# Patient Record
Sex: FEMALE | Race: WHITE | NOT HISPANIC OR LATINO | Employment: UNEMPLOYED | URBAN - METROPOLITAN AREA
[De-identification: names, ages, dates, MRNs, and addresses within clinical notes are randomized per-mention and may not be internally consistent; named-entity substitution may affect disease eponyms.]

---

## 2018-06-24 ENCOUNTER — HOSPITAL ENCOUNTER (EMERGENCY)
Facility: HOSPITAL | Age: 43
Discharge: HOME | End: 2018-06-24
Attending: EMERGENCY MEDICINE
Payer: MEDICAID

## 2018-06-24 VITALS
OXYGEN SATURATION: 98 % | RESPIRATION RATE: 18 BRPM | TEMPERATURE: 100 F | SYSTOLIC BLOOD PRESSURE: 116 MMHG | WEIGHT: 150 LBS | HEART RATE: 87 BPM | HEIGHT: 66 IN | BODY MASS INDEX: 24.11 KG/M2 | DIASTOLIC BLOOD PRESSURE: 56 MMHG

## 2018-06-24 DIAGNOSIS — S92.902A CLOSED FRACTURE OF LEFT FOOT, INITIAL ENCOUNTER: Primary | ICD-10-CM

## 2018-06-24 DIAGNOSIS — S99.922A FOOT INJURY, LEFT, INITIAL ENCOUNTER: ICD-10-CM

## 2018-06-24 PROCEDURE — 99283 EMERGENCY DEPT VISIT LOW MDM: CPT | Mod: 25

## 2018-06-24 PROCEDURE — 25000003 PHARM REV CODE 250: Performed by: EMERGENCY MEDICINE

## 2018-06-24 PROCEDURE — 29515 APPLICATION SHORT LEG SPLINT: CPT | Mod: LT

## 2018-06-24 RX ORDER — ACETAMINOPHEN AND CODEINE PHOSPHATE 300; 30 MG/1; MG/1
1 TABLET ORAL EVERY 6 HOURS PRN
Qty: 12 TABLET | Refills: 0 | Status: SHIPPED | OUTPATIENT
Start: 2018-06-24 | End: 2018-07-04

## 2018-06-24 RX ORDER — SERTRALINE HYDROCHLORIDE 100 MG/1
TABLET, FILM COATED ORAL
COMMUNITY
End: 2023-03-22

## 2018-06-24 RX ORDER — DIVALPROEX SODIUM 500 MG/1
TABLET, DELAYED RELEASE ORAL
COMMUNITY
Start: 2018-02-21 | End: 2019-07-08

## 2018-06-24 RX ORDER — LISINOPRIL AND HYDROCHLOROTHIAZIDE 12.5; 2 MG/1; MG/1
TABLET ORAL
COMMUNITY
Start: 2018-01-19 | End: 2019-07-08

## 2018-06-24 RX ORDER — QUETIAPINE FUMARATE 100 MG/1
100 TABLET, FILM COATED ORAL
COMMUNITY
End: 2019-07-08

## 2018-06-24 RX ORDER — ACETAMINOPHEN AND CODEINE PHOSPHATE 300; 30 MG/1; MG/1
1 TABLET ORAL
Status: COMPLETED | OUTPATIENT
Start: 2018-06-24 | End: 2018-06-24

## 2018-06-24 RX ADMIN — ACETAMINOPHEN AND CODEINE PHOSPHATE 1 TABLET: 300; 30 TABLET ORAL at 08:06

## 2018-06-25 NOTE — ED NOTES
"Patient presents to ED with c/o pain to left foot. Patient was walking in heels and tripped, states that she "rolled her foot." Has been taking Ibuprofen and icing her foot with no relief. Swelling noted to lateral aspect of left foot. Denies numbness or tingling. Patient only able to minimally move toes on affected foot.   "

## 2018-06-25 NOTE — DISCHARGE INSTRUCTIONS
Thank you for choosing [facility]! We appreciate you coming to us for your medical care. We hope you feel better soon! Please come back to [facility] for all of your future medical needs.      Sincerely,    Apolinar Love MD  Medical Director  Emergency Department  [facility]

## 2018-06-25 NOTE — ED PROVIDER NOTES
Encounter Date: 2018    SCRIBE #1 NOTE: ISpencer, am scribing for, and in the presence of, Dr. Apolinar Love.       History     Chief Complaint   Patient presents with    Foot Injury     42y F to ED with c/o left foot pain after falling while walking in heels last night. pt unable to bear weight today     This is a 42 y.o. female who has a past medical history of Anxiety; Bipolar 2 disorder; and Pre-eclampsia.   The patient presents to the Emergency Department with complaints of a left foot injury that se sustained yesterday.   Symptoms are associated with pain to the left foot.  Pt denies any loss of consciousness, other injury or symptoms.   Pt reports she [place] when she tripped and rolled her left ankle.  Patient has no prior history of similar symptoms.   Pt has a past surgical history that includes Ovarian cyst removal; Appendectomy;  section; and Partial hysterectomy.      The history is provided by the patient. No  was used.     Review of patient's allergies indicates:  No Known Allergies  Past Medical History:   Diagnosis Date    Anxiety     Bipolar 2 disorder     Pre-eclampsia      Past Surgical History:   Procedure Laterality Date    APPENDECTOMY       SECTION      OVARIAN CYST REMOVAL      PARTIAL HYSTERECTOMY       Family History   Problem Relation Age of Onset    Hypertension Mother     Hypertension Maternal Grandmother      Social History   Substance Use Topics    Smoking status: Current Every Day Smoker     Packs/day: 1.00     Types: Cigarettes    Smokeless tobacco: Not on file    Alcohol use Yes      Comment: occasionally     Review of Systems   Musculoskeletal: Positive for arthralgias (Left foot pain).   All other systems reviewed and are negative.      Physical Exam     Initial Vitals [18 1926]   BP Pulse Resp Temp SpO2   (!) 116/56 87 18 100 °F (37.8 °C) 98 %      MAP       --         Physical  Exam    Nursing note and vitals reviewed.  Constitutional: She appears well-developed and well-nourished. She is not diaphoretic. No distress.   HENT:   Head: Normocephalic and atraumatic.   Mouth/Throat: Oropharynx is clear and moist.   Eyes: Conjunctivae are normal.   Cardiovascular: Normal rate, regular rhythm and intact distal pulses.   Pulmonary/Chest: No respiratory distress.   Musculoskeletal: Normal range of motion.   Tenderness and mild swelling to the base of the left 5th metatarsale.    Neurological: She is alert and oriented to person, place, and time.   Skin: Skin is warm and dry. Capillary refill takes less than 2 seconds. No rash noted. No erythema.   Psychiatric: She has a normal mood and affect.         ED Course   Procedures  Labs Reviewed - No data to display       Imaging Results    None          Medical Decision Making:   Initial Assessment:   This is an emergent evaluation of a 42 y.o.female patient with presentation of left foot injury status post fall with inversion of left foot.  Initial differentials include but are not limited to: sprain, fracture, contusion, and strain.  Plan: X-ray left foot, RICE protocol and tylenol with codeine for pain.    ED Management:  9:04 PM   Foot X-ray shows a fracture of the 5th metatarsal shaft, possibly acute on chronic.   We will place the patient in a walking boot (she has crutches already at home), give tylenol codeine for the pain and advice RICE protocol.    Clinical impression and plan discussed with patient.    Pt is to call for follow up with Podiatry.  Pt to return to the ED for any new or concerning symptoms.  Aftercare instructions and return precautions provided to patient.   Pt expressed understanding and agrees with plan.                        Clinical Impression:     1. Closed fracture of left foot, initial encounter    2. Foot injury, left, initial encounter                                  Apolinar Love MD  06/24/18 8919

## 2018-12-13 DIAGNOSIS — Z12.31 VISIT FOR SCREENING MAMMOGRAM: Primary | ICD-10-CM

## 2018-12-21 ENCOUNTER — HOSPITAL ENCOUNTER (OUTPATIENT)
Dept: RADIOLOGY | Facility: HOSPITAL | Age: 43
Discharge: HOME | End: 2018-12-21
Attending: NURSE PRACTITIONER
Payer: MEDICAID

## 2018-12-21 DIAGNOSIS — Z12.31 VISIT FOR SCREENING MAMMOGRAM: ICD-10-CM

## 2018-12-21 PROCEDURE — 77067 SCR MAMMO BI INCL CAD: CPT | Mod: 26,,, | Performed by: RADIOLOGY

## 2018-12-21 PROCEDURE — 77067 SCR MAMMO BI INCL CAD: CPT | Mod: TC

## 2019-04-23 ENCOUNTER — OFFICE VISIT (OUTPATIENT)
Dept: URGENT CARE | Facility: CLINIC | Age: 44
End: 2019-04-23
Payer: MEDICAID

## 2019-04-23 VITALS
RESPIRATION RATE: 16 BRPM | TEMPERATURE: 99 F | OXYGEN SATURATION: 98 % | HEIGHT: 66 IN | SYSTOLIC BLOOD PRESSURE: 129 MMHG | HEART RATE: 88 BPM | WEIGHT: 170 LBS | BODY MASS INDEX: 27.32 KG/M2 | DIASTOLIC BLOOD PRESSURE: 75 MMHG

## 2019-04-23 DIAGNOSIS — Z00.00 HEALTH CARE MAINTENANCE: ICD-10-CM

## 2019-04-23 DIAGNOSIS — L03.116 CELLULITIS OF LEFT ANKLE: Primary | ICD-10-CM

## 2019-04-23 PROCEDURE — 99214 PR OFFICE/OUTPT VISIT, EST, LEVL IV, 30-39 MIN: ICD-10-PCS | Mod: S$GLB,,, | Performed by: NURSE PRACTITIONER

## 2019-04-23 PROCEDURE — 99214 OFFICE O/P EST MOD 30 MIN: CPT | Mod: S$GLB,,, | Performed by: NURSE PRACTITIONER

## 2019-04-23 RX ORDER — CEPHALEXIN 500 MG/1
500 CAPSULE ORAL EVERY 12 HOURS
Qty: 14 CAPSULE | Refills: 0 | Status: SHIPPED | OUTPATIENT
Start: 2019-04-23 | End: 2019-04-30

## 2019-04-23 NOTE — PROGRESS NOTES
"Subjective:       Patient ID: Trice Fink is a 43 y.o. female.    Vitals:  height is 5' 6" (1.676 m) and weight is 77.1 kg (170 lb). Her oral temperature is 98.9 °F (37.2 °C). Her blood pressure is 129/75 and her pulse is 88. Her respiration is 16 and oxygen saturation is 98%.     Chief Complaint: Insect Bite    Patient c/o redness and pain on her left ankle.  It started out as an insect bite and now the redness and swelling has increased in size.  She has been soaking the ankle but it does not help.     Insect Bite   This is a new problem. Episode onset: 3days. The problem occurs constantly. The problem has been gradually worsening. Pertinent negatives include no arthralgias, chills, coughing, diaphoresis, fatigue, fever, joint swelling or sore throat. The symptoms are aggravated by walking. She has tried ice for the symptoms. The treatment provided mild relief.       Constitution: Negative for chills, sweating, fatigue and fever.   HENT: Negative for facial swelling and sore throat.    Neck: Negative for painful lymph nodes.   Eyes: Negative for eye itching and eyelid swelling.   Respiratory: Negative for cough.    Musculoskeletal: Negative for joint pain and joint swelling.   Skin: Positive for lesion and erythema. Negative for color change, pale, wound, abrasion, laceration, puncture wound, bruising, abscess, avulsion and hives.   Allergic/Immunologic: Negative for environmental allergies, immunocompromised state and hives.   Hematologic/Lymphatic: Negative for swollen lymph nodes.       Objective:      Physical Exam   Constitutional: She is oriented to person, place, and time. Vital signs are normal. She appears well-developed and well-nourished.   HENT:   Head: Normocephalic and atraumatic. Head is without abrasion, without contusion and without laceration.   Right Ear: External ear normal.   Left Ear: External ear normal.   Nose: Nose normal.   Mouth/Throat: Oropharynx is clear and moist.   Eyes: Pupils " are equal, round, and reactive to light. Conjunctivae, EOM and lids are normal.   Neck: Trachea normal, full passive range of motion without pain and phonation normal. Neck supple.   Cardiovascular: Normal rate, regular rhythm and normal heart sounds.   Pulses:       Dorsalis pedis pulses are 2+ on the right side, and 2+ on the left side.   Pulmonary/Chest: Effort normal and breath sounds normal. No stridor. No respiratory distress.   Musculoskeletal: Normal range of motion.        Right foot: There is normal range of motion.        Left foot: There is tenderness and swelling. There is normal range of motion and normal capillary refill.        Feet:    Feet:   Left Foot:   Skin Integrity: Positive for erythema and warmth.   Neurological: She is alert and oriented to person, place, and time.   Skin: Skin is warm, dry and intact. Capillary refill takes less than 2 seconds. No abrasion, no bruising, no burn, no ecchymosis, no laceration, no lesion and no rash noted. There is erythema.   Psychiatric: She has a normal mood and affect. Her speech is normal and behavior is normal. Judgment and thought content normal. Cognition and memory are normal.   Nursing note and vitals reviewed.            Assessment:       1. Cellulitis of left ankle    2. Health care maintenance        Plan:         Cellulitis of left ankle  -     Ambulatory referral to [facility]    Health care maintenance  -     Ambulatory referral to [facility]    Other orders  -     cephALEXin (KEFLEX) 500 MG capsule; Take 1 capsule (500 mg total) by mouth every 12 (twelve) hours. for 7 days  Dispense: 14 capsule; Refill: 0

## 2019-04-23 NOTE — PATIENT INSTRUCTIONS
Return to Urgent Care or go to ER if symptoms worsen or fail to improve.  Follow up with PCP as recommended for further management.     Please call [phone] to schedule an appointment with Primary Care.    Cellulitis  Cellulitis is an infection of the deep layers of skin. A break in the skin, such as a cut or scratch, can let bacteria under the skin. If the bacteria get to deep layers of the skin, it can be serious. If not treated, cellulitis can get into the bloodstream and lymph nodes. The infection can then spread throughout the body. This causes serious illness.  Cellulitis causes the affected skin to become red, swollen, warm, and sore. The reddened areas have a visible border. An open sore may leak fluid (pus). You may have a fever, chills, and pain.  Cellulitis is treated with antibiotics taken for 7 to 10 days. An open sore may be cleaned and covered with cool wet gauze. Symptoms should get better 1 to 2 days after treatment is started. Make sure to take all the antibiotics for the full number of days until they are gone. Keep taking the medicine even if your symptoms go away.  Home care  Follow these tips:  · Limit the use of the part of your body with cellulitis.   · If the infection is on your leg, keep your leg raised while sitting. This will help to reduce swelling.  · Take all of the antibiotic medicine exactly as directed until it is gone. Do not miss any doses, especially during the first 7 days. Dont stop taking the medicine when your symptoms get better.  · Keep the affected area clean and dry.  · Wash your hands with soap and warm water before and after touching your skin. Anyone else who touches your skin should also wash his or her hands. Don't share towels.  Follow-up care  Follow up with your healthcare provider, or as advised. If your infection does not go away on the first antibiotic, your healthcare provider will prescribe a different one.  When to seek medical advice  Call your  healthcare provider right away if any of these occur:  · Red areas that spread  · Swelling or pain that gets worse  · Fluid leaking from the skin (pus)  · Fever higher of 100.4º F (38.0º C) or higher after 2 days on antibiotics  Date Last Reviewed: 9/1/2016  © 2888-6061 [employer]. [address] [place] [address], [place] [address]. All rights reserved. This information is not intended as a substitute for professional medical care. Always follow your healthcare professional's instructions.

## 2019-05-28 ENCOUNTER — HOSPITAL ENCOUNTER (EMERGENCY)
Facility: HOSPITAL | Age: 44
Discharge: HOME | End: 2019-05-28
Attending: EMERGENCY MEDICINE
Payer: MEDICAID

## 2019-05-28 VITALS
BODY MASS INDEX: 26.52 KG/M2 | WEIGHT: 165 LBS | OXYGEN SATURATION: 99 % | TEMPERATURE: 99 F | HEIGHT: 66 IN | HEART RATE: 68 BPM | DIASTOLIC BLOOD PRESSURE: 60 MMHG | RESPIRATION RATE: 20 BRPM | SYSTOLIC BLOOD PRESSURE: 114 MMHG

## 2019-05-28 DIAGNOSIS — M79.672 LEFT FOOT PAIN: ICD-10-CM

## 2019-05-28 PROCEDURE — 96372 THER/PROPH/DIAG INJ SC/IM: CPT

## 2019-05-28 PROCEDURE — 63600175 PHARM REV CODE 636 W HCPCS: Performed by: EMERGENCY MEDICINE

## 2019-05-28 PROCEDURE — 99284 EMERGENCY DEPT VISIT MOD MDM: CPT | Mod: 25

## 2019-05-28 RX ORDER — KETOROLAC TROMETHAMINE 30 MG/ML
30 INJECTION, SOLUTION INTRAMUSCULAR; INTRAVENOUS
Status: COMPLETED | OUTPATIENT
Start: 2019-05-28 | End: 2019-05-28

## 2019-05-28 RX ADMIN — KETOROLAC TROMETHAMINE 30 MG: 30 INJECTION, SOLUTION INTRAMUSCULAR at 09:05

## 2019-05-29 NOTE — ED PROVIDER NOTES
"Encounter Date: 2019    SCRIBE #1 NOTE: I, Jamie Basilio, am scribing for, and in the presence of,  Dr. Solis Gregory. I have scribed the entire note.       History     Chief Complaint   Patient presents with    Foot Pain     states had surgery on left foot in november and had to have a metal aftab placed due to a break; states pain began on friday and now has "something sticking out of foot". No obvious deformity noted. Able to ambulate to triage.      43 year-old F presents to the ED c/o left foot pain that started 4 days ago. Patient reports left foot "has something sticking out" which she noticed while mowing the lawn. States she felt a burning pain at that time described as similar to an insect bite. Since then, pain has been a constant aching worse with ambulation and without alleviating factors. Patient reports hx of left foot surgery at [facility] to repair a fracture in 2018 without any complications.  She has not taken any medication for the pain. Denies any recurrent injury. Denies any other symptoms at this time.     The history is provided by the patient.     Review of patient's allergies indicates:  No Known Allergies  Past Medical History:   Diagnosis Date    Anxiety     Bipolar 2 disorder     Pre-eclampsia      Past Surgical History:   Procedure Laterality Date    APPENDECTOMY       SECTION      HYSTERECTOMY      OVARIAN CYST REMOVAL      PARTIAL HYSTERECTOMY       Family History   Problem Relation Age of Onset    Hypertension Mother     Hypertension Maternal Grandmother     Cancer Father      Social History     Tobacco Use    Smoking status: Current Every Day Smoker     Packs/day: 1.00     Types: Cigarettes   Substance Use Topics    Alcohol use: Yes     Comment: occasionally    Drug use: No     Review of Systems   Constitutional: Negative for chills, fatigue and fever.   Respiratory: Negative for choking and shortness of breath.    Cardiovascular: Negative for chest pain " and leg swelling.   Gastrointestinal: Negative for nausea and vomiting.   Musculoskeletal: Negative for back pain, neck pain and neck stiffness.        + left foot pain   Neurological: Negative for light-headedness, numbness and headaches.       Physical Exam     Initial Vitals [05/28/19 2057]   BP Pulse Resp Temp SpO2   (!) 112/58 68 18 98.7 °F (37.1 °C) 99 %      MAP       --         Physical Exam    Nursing note and vitals reviewed.  Constitutional: She appears well-developed and well-nourished. No distress.   HENT:   Head: Normocephalic and atraumatic.   Eyes: Conjunctivae and EOM are normal.   Neck: Normal range of motion. Neck supple. No tracheal deviation present.   Cardiovascular: Normal rate and intact distal pulses.   2+ DP pulse B/L   Pulmonary/Chest: No respiratory distress.   Musculoskeletal: Normal range of motion. She exhibits tenderness. She exhibits no edema.        Feet:    Mild left lateral foot tenderness.   No deformity noted.   Neurological: She is alert and oriented to person, place, and time. She has normal strength.   Skin: Skin is warm and dry. Capillary refill takes less than 2 seconds.         ED Course   Procedures  Labs Reviewed - No data to display         X-Rays:   Independently Interpreted Readings:   Other Readings:  XR L Foot interpreted by me pending radiology over read: No displacement of hardware; No acute fracture or dislocation noted    Medical Decision Making:   Initial Assessment:   43 year-old F presents to the ED c/o left foot pain that started 4 days ago.   Differential Diagnosis:   Fx, dislocation, contusion, sprain, strain  Independently Interpreted Test(s):   I have ordered and independently interpreted X-rays - see prior notes.  ED Management:  Px given IM toradol. Feeling somewhat better. Informed her of results as well as plan to discharge with home mgmt techniques, f/u with her orthopedist for reevaluation, reasons to return to the ED. Patient expressed good  understanding and is comfortable with discharge at this time.                       Clinical Impression:       ICD-10-CM ICD-9-CM   1. Left foot pain M79.672 729.5           Disposition:   Disposition: Discharged  Condition: Stable       I, Dr. Solis Gregory, personally performed the services described in this documentation. All medical record entries made by the scribe were at my direction and in my presence.  I have reviewed the chart and agree that the record reflects my personal performance and is accurate and complete. Solis Gregory MD.  10:27 PM 05/28/2019                     Solis Gregory MD  05/28/19 9955

## 2019-05-29 NOTE — ED NOTES
Pt presents with c/o left foot pain x4 days. Had ORIF on same foot in November and she thinks some of the hardware is broken to lateral side of foot.

## 2019-06-03 ENCOUNTER — HOSPITAL ENCOUNTER (OUTPATIENT)
Facility: HOSPITAL | Age: 44
Discharge: HOME | End: 2019-06-05
Attending: SURGERY | Admitting: INTERNAL MEDICINE
Payer: MEDICAID

## 2019-06-03 DIAGNOSIS — J18.9 PNEUMONIA: ICD-10-CM

## 2019-06-03 DIAGNOSIS — N30.00 ACUTE CYSTITIS WITHOUT HEMATURIA: Primary | ICD-10-CM

## 2019-06-03 DIAGNOSIS — F14.10 COCAINE ABUSE: ICD-10-CM

## 2019-06-03 LAB
ALBUMIN SERPL BCP-MCNC: 3.6 G/DL (ref 3.5–5.2)
ALP SERPL-CCNC: 79 U/L (ref 55–135)
ALT SERPL W/O P-5'-P-CCNC: 12 U/L (ref 10–44)
AMPHET+METHAMPHET UR QL: NEGATIVE
ANION GAP SERPL CALC-SCNC: 11 MMOL/L (ref 8–16)
AST SERPL-CCNC: 21 U/L (ref 10–40)
BACTERIA #/AREA URNS HPF: ABNORMAL /HPF
BARBITURATES UR QL SCN>200 NG/ML: NORMAL
BASOPHILS # BLD AUTO: 0.05 K/UL (ref 0–0.2)
BASOPHILS NFR BLD: 0.3 % (ref 0–1.9)
BENZODIAZ UR QL SCN>200 NG/ML: NEGATIVE
BILIRUB SERPL-MCNC: 0.3 MG/DL (ref 0.1–1)
BILIRUB UR QL STRIP: NEGATIVE
BUN SERPL-MCNC: 9 MG/DL (ref 6–20)
BZE UR QL SCN: NORMAL
CALCIUM SERPL-MCNC: 9 MG/DL (ref 8.7–10.5)
CANNABINOIDS UR QL SCN: NEGATIVE
CHLORIDE SERPL-SCNC: 105 MMOL/L (ref 95–110)
CK MB SERPL-MCNC: 1.4 NG/ML (ref 0.1–6.5)
CK MB SERPL-RTO: 2.9 % (ref 0–5)
CK SERPL-CCNC: 49 U/L (ref 20–180)
CK SERPL-CCNC: 49 U/L (ref 20–180)
CLARITY UR: ABNORMAL
CO2 SERPL-SCNC: 23 MMOL/L (ref 23–29)
COLOR UR: YELLOW
CREAT SERPL-MCNC: 0.7 MG/DL (ref 0.5–1.4)
CREAT UR-MCNC: 153.8 MG/DL (ref 15–325)
DIFFERENTIAL METHOD: ABNORMAL
EOSINOPHIL # BLD AUTO: 0.1 K/UL (ref 0–0.5)
EOSINOPHIL NFR BLD: 0.9 % (ref 0–8)
ERYTHROCYTE [DISTWIDTH] IN BLOOD BY AUTOMATED COUNT: 16.2 % (ref 11.5–14.5)
EST. GFR  (AFRICAN AMERICAN): >60 ML/MIN/1.73 M^2
EST. GFR  (NON AFRICAN AMERICAN): >60 ML/MIN/1.73 M^2
GLUCOSE SERPL-MCNC: 94 MG/DL (ref 70–110)
GLUCOSE UR QL STRIP: NEGATIVE
HCT VFR BLD AUTO: 37.5 % (ref 37–48.5)
HGB BLD-MCNC: 12.8 G/DL (ref 12–16)
HGB UR QL STRIP: ABNORMAL
HYALINE CASTS #/AREA URNS LPF: 0 /LPF
KETONES UR QL STRIP: NEGATIVE
LACTATE SERPL-SCNC: 1.3 MMOL/L (ref 0.5–2.2)
LEUKOCYTE ESTERASE UR QL STRIP: ABNORMAL
LYMPHOCYTES # BLD AUTO: 2.8 K/UL (ref 1–4.8)
LYMPHOCYTES NFR BLD: 18.3 % (ref 18–48)
MCH RBC QN AUTO: 30.7 PG (ref 27–31)
MCHC RBC AUTO-ENTMCNC: 34.1 G/DL (ref 32–36)
MCV RBC AUTO: 90 FL (ref 82–98)
METHADONE UR QL SCN>300 NG/ML: NEGATIVE
MICROSCOPIC COMMENT: ABNORMAL
MONOCYTES # BLD AUTO: 1.1 K/UL (ref 0.3–1)
MONOCYTES NFR BLD: 7.5 % (ref 4–15)
NEUTROPHILS # BLD AUTO: 11.2 K/UL (ref 1.8–7.7)
NEUTROPHILS NFR BLD: 73 % (ref 38–73)
NITRITE UR QL STRIP: POSITIVE
OPIATES UR QL SCN: NEGATIVE
PCP UR QL SCN>25 NG/ML: NEGATIVE
PH UR STRIP: 7 [PH] (ref 5–8)
PLATELET # BLD AUTO: 346 K/UL (ref 150–350)
PMV BLD AUTO: 9.8 FL (ref 9.2–12.9)
POTASSIUM SERPL-SCNC: 3.9 MMOL/L (ref 3.5–5.1)
PROT SERPL-MCNC: 7.2 G/DL (ref 6–8.4)
PROT UR QL STRIP: ABNORMAL
RBC # BLD AUTO: 4.17 M/UL (ref 4–5.4)
RBC #/AREA URNS HPF: 60 /HPF (ref 0–4)
SODIUM SERPL-SCNC: 139 MMOL/L (ref 136–145)
SP GR UR STRIP: 1.02 (ref 1–1.03)
SQUAMOUS #/AREA URNS HPF: 4 /HPF
TOXICOLOGY INFORMATION: NORMAL
TROPONIN I SERPL DL<=0.01 NG/ML-MCNC: <0.006 NG/ML (ref 0–0.03)
URN SPEC COLLECT METH UR: ABNORMAL
UROBILINOGEN UR STRIP-ACNC: 1 EU/DL
WBC # BLD AUTO: 15.29 K/UL (ref 3.9–12.7)
WBC #/AREA URNS HPF: >100 /HPF (ref 0–5)

## 2019-06-03 PROCEDURE — 83605 ASSAY OF LACTIC ACID: CPT | Mod: 91

## 2019-06-03 PROCEDURE — 11000001 HC ACUTE MED/SURG PRIVATE ROOM

## 2019-06-03 PROCEDURE — 93005 ELECTROCARDIOGRAM TRACING: CPT

## 2019-06-03 PROCEDURE — G0378 HOSPITAL OBSERVATION PER HR: HCPCS

## 2019-06-03 PROCEDURE — 96365 THER/PROPH/DIAG IV INF INIT: CPT

## 2019-06-03 PROCEDURE — 96375 TX/PRO/DX INJ NEW DRUG ADDON: CPT

## 2019-06-03 PROCEDURE — 25000003 PHARM REV CODE 250: Performed by: SURGERY

## 2019-06-03 PROCEDURE — 63600175 PHARM REV CODE 636 W HCPCS: Performed by: SURGERY

## 2019-06-03 PROCEDURE — 81000 URINALYSIS NONAUTO W/SCOPE: CPT

## 2019-06-03 PROCEDURE — 83605 ASSAY OF LACTIC ACID: CPT

## 2019-06-03 PROCEDURE — 94760 N-INVAS EAR/PLS OXIMETRY 1: CPT

## 2019-06-03 PROCEDURE — 80307 DRUG TEST PRSMV CHEM ANLYZR: CPT

## 2019-06-03 PROCEDURE — 63600175 PHARM REV CODE 636 W HCPCS: Performed by: NURSE PRACTITIONER

## 2019-06-03 PROCEDURE — 84484 ASSAY OF TROPONIN QUANT: CPT

## 2019-06-03 PROCEDURE — 93010 EKG 12-LEAD: ICD-10-PCS | Mod: ,,, | Performed by: INTERNAL MEDICINE

## 2019-06-03 PROCEDURE — 87088 URINE BACTERIA CULTURE: CPT

## 2019-06-03 PROCEDURE — 93010 ELECTROCARDIOGRAM REPORT: CPT | Mod: ,,, | Performed by: INTERNAL MEDICINE

## 2019-06-03 PROCEDURE — 96361 HYDRATE IV INFUSION ADD-ON: CPT

## 2019-06-03 PROCEDURE — 82550 ASSAY OF CK (CPK): CPT

## 2019-06-03 PROCEDURE — 85025 COMPLETE CBC W/AUTO DIFF WBC: CPT

## 2019-06-03 PROCEDURE — 87040 BLOOD CULTURE FOR BACTERIA: CPT

## 2019-06-03 PROCEDURE — 99285 EMERGENCY DEPT VISIT HI MDM: CPT | Mod: 25

## 2019-06-03 PROCEDURE — 82553 CREATINE MB FRACTION: CPT

## 2019-06-03 PROCEDURE — 36415 COLL VENOUS BLD VENIPUNCTURE: CPT

## 2019-06-03 PROCEDURE — 87077 CULTURE AEROBIC IDENTIFY: CPT

## 2019-06-03 PROCEDURE — 87086 URINE CULTURE/COLONY COUNT: CPT

## 2019-06-03 PROCEDURE — 80053 COMPREHEN METABOLIC PANEL: CPT

## 2019-06-03 PROCEDURE — 87186 SC STD MICRODIL/AGAR DIL: CPT

## 2019-06-03 RX ORDER — LEVOFLOXACIN 5 MG/ML
500 INJECTION, SOLUTION INTRAVENOUS
Status: DISCONTINUED | OUTPATIENT
Start: 2019-06-03 | End: 2019-06-05 | Stop reason: HOSPADM

## 2019-06-03 RX ORDER — KETOROLAC TROMETHAMINE 30 MG/ML
30 INJECTION, SOLUTION INTRAMUSCULAR; INTRAVENOUS
Status: COMPLETED | OUTPATIENT
Start: 2019-06-03 | End: 2019-06-03

## 2019-06-03 RX ORDER — ACETAMINOPHEN 325 MG/1
650 TABLET ORAL EVERY 8 HOURS PRN
Status: DISCONTINUED | OUTPATIENT
Start: 2019-06-03 | End: 2019-06-05 | Stop reason: HOSPADM

## 2019-06-03 RX ORDER — PANTOPRAZOLE SODIUM 40 MG/1
40 TABLET, DELAYED RELEASE ORAL DAILY
Status: DISCONTINUED | OUTPATIENT
Start: 2019-06-04 | End: 2019-06-05 | Stop reason: HOSPADM

## 2019-06-03 RX ORDER — SODIUM CHLORIDE 9 MG/ML
INJECTION, SOLUTION INTRAVENOUS CONTINUOUS
Status: DISCONTINUED | OUTPATIENT
Start: 2019-06-03 | End: 2019-06-05 | Stop reason: HOSPADM

## 2019-06-03 RX ORDER — IPRATROPIUM BROMIDE AND ALBUTEROL SULFATE 2.5; .5 MG/3ML; MG/3ML
3 SOLUTION RESPIRATORY (INHALATION) EVERY 4 HOURS
Status: DISCONTINUED | OUTPATIENT
Start: 2019-06-04 | End: 2019-06-04

## 2019-06-03 RX ORDER — SODIUM CHLORIDE 0.9 % (FLUSH) 0.9 %
10 SYRINGE (ML) INJECTION
Status: DISCONTINUED | OUTPATIENT
Start: 2019-06-03 | End: 2019-06-05 | Stop reason: HOSPADM

## 2019-06-03 RX ORDER — HYDROCODONE BITARTRATE AND ACETAMINOPHEN 5; 325 MG/1; MG/1
1 TABLET ORAL EVERY 4 HOURS PRN
Status: DISCONTINUED | OUTPATIENT
Start: 2019-06-03 | End: 2019-06-05 | Stop reason: HOSPADM

## 2019-06-03 RX ORDER — ONDANSETRON 2 MG/ML
4 INJECTION INTRAMUSCULAR; INTRAVENOUS EVERY 8 HOURS PRN
Status: DISCONTINUED | OUTPATIENT
Start: 2019-06-03 | End: 2019-06-05 | Stop reason: HOSPADM

## 2019-06-03 RX ORDER — SODIUM CHLORIDE 9 MG/ML
1000 INJECTION, SOLUTION INTRAVENOUS
Status: COMPLETED | OUTPATIENT
Start: 2019-06-03 | End: 2019-06-03

## 2019-06-03 RX ADMIN — KETOROLAC TROMETHAMINE 30 MG: 30 INJECTION, SOLUTION INTRAMUSCULAR; INTRAVENOUS at 06:06

## 2019-06-03 RX ADMIN — SODIUM CHLORIDE: 0.9 INJECTION, SOLUTION INTRAVENOUS at 10:06

## 2019-06-03 RX ADMIN — SODIUM CHLORIDE 1000 ML: 0.9 INJECTION, SOLUTION INTRAVENOUS at 06:06

## 2019-06-03 RX ADMIN — LEVOFLOXACIN 500 MG: 5 INJECTION, SOLUTION INTRAVENOUS at 10:06

## 2019-06-03 RX ADMIN — CEFTRIAXONE 2 G: 2 INJECTION, SOLUTION INTRAVENOUS at 06:06

## 2019-06-03 RX ADMIN — HYDROCODONE BITARTRATE AND ACETAMINOPHEN 1 TABLET: 5; 325 TABLET ORAL at 10:06

## 2019-06-03 NOTE — ED PROVIDER NOTES
Encounter Date: 6/3/2019       History     Chief Complaint   Patient presents with    Flank Pain     C/O LEFT FLANK PAIN THAT BEGAN ABRUPTLY THIS AM. REPORTS DYSURIA     43-year-old female presents to the emergency room with left flank pain; new onset this a.m..  She reports that the pain is constant and described as stabbing.  Reports the pain radiates to her left abdomen.  Current pain is rated 9/10.  Denies nausea, vomiting, or diarrhea.  Reports urinary dysuria. Denies frequency, urgency, or hematuria.  Denies fever.  Denies injury or trauma.    The history is provided by the patient.     Review of patient's allergies indicates:  No Known Allergies  Past Medical History:   Diagnosis Date    Anxiety     Bipolar 2 disorder     Pre-eclampsia      Past Surgical History:   Procedure Laterality Date    APPENDECTOMY       SECTION      FOOT SURGERY Left     HYSTERECTOMY      OVARIAN CYST REMOVAL      PARTIAL HYSTERECTOMY       Family History   Problem Relation Age of Onset    Hypertension Mother     Hypertension Maternal Grandmother     Cancer Father      Social History     Tobacco Use    Smoking status: Current Every Day Smoker     Packs/day: 1.00     Types: Cigarettes   Substance Use Topics    Alcohol use: Yes     Comment: occasionally    Drug use: No     Review of Systems   Constitutional: Positive for diaphoresis. Negative for fever.   HENT: Negative for congestion, ear pain, rhinorrhea, sore throat and trouble swallowing.    Eyes: Negative for pain, discharge, redness and visual disturbance.   Respiratory: Negative for cough, shortness of breath and wheezing.    Cardiovascular: Negative for chest pain and leg swelling.   Gastrointestinal: Negative for abdominal pain, constipation, diarrhea, nausea and vomiting.   Genitourinary: Positive for dysuria and flank pain. Negative for difficulty urinating, frequency and urgency.   Musculoskeletal: Negative for arthralgias, back pain, myalgias and  neck pain.   Skin: Negative for rash and wound.   Neurological: Negative for seizures, weakness and headaches.   Psychiatric/Behavioral: Negative.        Physical Exam     Initial Vitals [06/03/19 1739]   BP Pulse Resp Temp SpO2   134/86 86 16 99.1 °F (37.3 °C) 99 %      MAP       --         Physical Exam    Nursing note and vitals reviewed.  Constitutional: She is diaphoretic ( patient appears to be in pain).   HENT:   Head: Normocephalic and atraumatic.   Right Ear: External ear normal.   Left Ear: External ear normal.   Nose: Nose normal.   Mouth/Throat: Oropharynx is clear and moist.   Eyes: Conjunctivae, EOM and lids are normal. Pupils are equal, round, and reactive to light.   Neck: Neck supple.   Cardiovascular: Normal rate, regular rhythm, S1 normal, S2 normal, normal heart sounds and intact distal pulses.   Pulmonary/Chest: Effort normal and breath sounds normal. No respiratory distress.   Abdominal: Soft. Bowel sounds are normal. There is no tenderness. There is CVA tenderness (Left).   Musculoskeletal: Normal range of motion.   Neurological: She is alert and oriented to person, place, and time. She has normal strength. GCS eye subscore is 4. GCS verbal subscore is 5. GCS motor subscore is 6.   Skin: Skin is warm. Capillary refill takes less than 2 seconds. No rash noted.   Psychiatric: She has a normal mood and affect. Her speech is normal and behavior is normal.         ED Course   Procedures  Labs Reviewed   CBC W/ AUTO DIFFERENTIAL - Abnormal; Notable for the following components:       Result Value    WBC 15.29 (*)     RDW 16.2 (*)     Gran # (ANC) 11.2 (*)     Mono # 1.1 (*)     All other components within normal limits   URINALYSIS, REFLEX TO URINE CULTURE - Abnormal; Notable for the following components:    Appearance, UA Cloudy (*)     Protein, UA 3+ (*)     Occult Blood UA 3+ (*)     Nitrite, UA Positive (*)     Leukocytes, UA 2+ (*)     All other components within normal limits    Narrative:      Preferred Collection Type->Urine, Clean Catch   URINALYSIS MICROSCOPIC - Abnormal; Notable for the following components:    RBC, UA 60 (*)     WBC, UA >100 (*)     Bacteria Many (*)     All other components within normal limits    Narrative:     Preferred Collection Type->Urine, Clean Catch   CULTURE, URINE   COMPREHENSIVE METABOLIC PANEL   DRUG SCREEN PANEL, URINE EMERGENCY    Narrative:     Preferred Collection Type->Urine, Clean Catch   CK-MB   CK   TROPONIN I     EKG Readings: (Independently Interpreted)   EKG read with MD at the time it was performed. EKG without concerning findings.        Imaging Results          CT Renal Stone Study ABD Pelvis WO (In process)                      Medications   ketorolac injection 30 mg (has no administration in time range)   0.9%  NaCl infusion (1,000 mLs Intravenous New Bag 6/3/19 6354)                       Clinical Impression:       ICD-10-CM ICD-9-CM   1. Acute cystitis without hematuria N30.00 595.0   2. Cocaine abuse F14.10 305.60   3. Pneumonia J18.9 486         Disposition:   Disposition: Admitted  Condition: Stable           I took over this patient from the nurse practitioner today  This patient has had fevers and chills and left flank pain  Patient has urinary tract infection without signs of pyelonephritis  Patient has a white count 03491, additionally has pneumonia bilateral bases  Patient also had a UDS positive for cocaine, no evidence of aspiration now  Will put the patient on IV Levaquin at the request of the primary care admitting physician  Breathing treatments and IV fluids, patient will be seen by pulmonology for pneumonia             Byron Garibay MD  06/03/19 2016

## 2019-06-03 NOTE — ED NOTES
POC DISCUSSED WITH PT. CONTINUES TO C/O LEFT FLANK PAIN RATING 9/10. IV TORADOL GIVEN SIVP. PIV SITE INTACT. IVFs TO GRAVITY. VERBAL REPORT GIVEN TO STEPHANIE LLAMAS

## 2019-06-04 PROBLEM — I10 ESSENTIAL HYPERTENSION: Status: ACTIVE | Noted: 2019-06-04

## 2019-06-04 PROBLEM — F31.9 BIPOLAR DEPRESSION: Status: ACTIVE | Noted: 2019-06-04

## 2019-06-04 PROBLEM — R65.10 SIRS (SYSTEMIC INFLAMMATORY RESPONSE SYNDROME): Status: ACTIVE | Noted: 2019-06-04

## 2019-06-04 PROBLEM — F19.10 SUBSTANCE ABUSE: Status: ACTIVE | Noted: 2019-06-04

## 2019-06-04 PROBLEM — J18.9 PNEUMONIA OF BOTH LUNGS DUE TO INFECTIOUS ORGANISM: Status: ACTIVE | Noted: 2019-06-04

## 2019-06-04 LAB
ALBUMIN SERPL BCP-MCNC: 2.6 G/DL (ref 3.5–5.2)
ALP SERPL-CCNC: 66 U/L (ref 55–135)
ALT SERPL W/O P-5'-P-CCNC: 9 U/L (ref 10–44)
ANION GAP SERPL CALC-SCNC: 7 MMOL/L (ref 8–16)
AST SERPL-CCNC: 12 U/L (ref 10–40)
BASOPHILS # BLD AUTO: 0.03 K/UL (ref 0–0.2)
BASOPHILS NFR BLD: 0.4 % (ref 0–1.9)
BILIRUB SERPL-MCNC: 0.2 MG/DL (ref 0.1–1)
BUN SERPL-MCNC: 15 MG/DL (ref 6–20)
CALCIUM SERPL-MCNC: 8.1 MG/DL (ref 8.7–10.5)
CHLORIDE SERPL-SCNC: 111 MMOL/L (ref 95–110)
CO2 SERPL-SCNC: 23 MMOL/L (ref 23–29)
CREAT SERPL-MCNC: 0.7 MG/DL (ref 0.5–1.4)
DIFFERENTIAL METHOD: ABNORMAL
EOSINOPHIL # BLD AUTO: 0.3 K/UL (ref 0–0.5)
EOSINOPHIL NFR BLD: 3.2 % (ref 0–8)
ERYTHROCYTE [DISTWIDTH] IN BLOOD BY AUTOMATED COUNT: 16.7 % (ref 11.5–14.5)
EST. GFR  (AFRICAN AMERICAN): >60 ML/MIN/1.73 M^2
EST. GFR  (NON AFRICAN AMERICAN): >60 ML/MIN/1.73 M^2
GLUCOSE SERPL-MCNC: 110 MG/DL (ref 70–110)
HCT VFR BLD AUTO: 34.4 % (ref 37–48.5)
HGB BLD-MCNC: 11.4 G/DL (ref 12–16)
LACTATE SERPL-SCNC: 1 MMOL/L (ref 0.5–2.2)
LYMPHOCYTES # BLD AUTO: 2.6 K/UL (ref 1–4.8)
LYMPHOCYTES NFR BLD: 32.3 % (ref 18–48)
MCH RBC QN AUTO: 30.3 PG (ref 27–31)
MCHC RBC AUTO-ENTMCNC: 33.1 G/DL (ref 32–36)
MCV RBC AUTO: 92 FL (ref 82–98)
MONOCYTES # BLD AUTO: 0.7 K/UL (ref 0.3–1)
MONOCYTES NFR BLD: 9.3 % (ref 4–15)
NEUTROPHILS # BLD AUTO: 4.3 K/UL (ref 1.8–7.7)
NEUTROPHILS NFR BLD: 54.8 % (ref 38–73)
PLATELET # BLD AUTO: 295 K/UL (ref 150–350)
PMV BLD AUTO: 10 FL (ref 9.2–12.9)
POTASSIUM SERPL-SCNC: 3.6 MMOL/L (ref 3.5–5.1)
PROT SERPL-MCNC: 5.5 G/DL (ref 6–8.4)
RBC # BLD AUTO: 3.76 M/UL (ref 4–5.4)
SODIUM SERPL-SCNC: 141 MMOL/L (ref 136–145)
WBC # BLD AUTO: 7.92 K/UL (ref 3.9–12.7)

## 2019-06-04 PROCEDURE — 96375 TX/PRO/DX INJ NEW DRUG ADDON: CPT

## 2019-06-04 PROCEDURE — 25000003 PHARM REV CODE 250: Performed by: SURGERY

## 2019-06-04 PROCEDURE — G0378 HOSPITAL OBSERVATION PER HR: HCPCS

## 2019-06-04 PROCEDURE — 94761 N-INVAS EAR/PLS OXIMETRY MLT: CPT

## 2019-06-04 PROCEDURE — 96376 TX/PRO/DX INJ SAME DRUG ADON: CPT

## 2019-06-04 PROCEDURE — 25000003 PHARM REV CODE 250: Performed by: INTERNAL MEDICINE

## 2019-06-04 PROCEDURE — 99222 1ST HOSP IP/OBS MODERATE 55: CPT | Mod: ,,, | Performed by: INTERNAL MEDICINE

## 2019-06-04 PROCEDURE — 99222 PR INITIAL HOSPITAL CARE,LEVL II: ICD-10-PCS | Mod: ,,, | Performed by: INTERNAL MEDICINE

## 2019-06-04 PROCEDURE — 36415 COLL VENOUS BLD VENIPUNCTURE: CPT

## 2019-06-04 PROCEDURE — 96361 HYDRATE IV INFUSION ADD-ON: CPT

## 2019-06-04 PROCEDURE — 25000003 PHARM REV CODE 250: Performed by: NURSE PRACTITIONER

## 2019-06-04 PROCEDURE — 25000242 PHARM REV CODE 250 ALT 637 W/ HCPCS: Performed by: SURGERY

## 2019-06-04 PROCEDURE — 63600175 PHARM REV CODE 636 W HCPCS: Performed by: SURGERY

## 2019-06-04 PROCEDURE — 85025 COMPLETE CBC W/AUTO DIFF WBC: CPT

## 2019-06-04 PROCEDURE — 99900035 HC TECH TIME PER 15 MIN (STAT)

## 2019-06-04 PROCEDURE — 80053 COMPREHEN METABOLIC PANEL: CPT

## 2019-06-04 PROCEDURE — 94640 AIRWAY INHALATION TREATMENT: CPT

## 2019-06-04 RX ORDER — SERTRALINE HYDROCHLORIDE 100 MG/1
100 TABLET, FILM COATED ORAL DAILY
Status: DISCONTINUED | OUTPATIENT
Start: 2019-06-04 | End: 2019-06-05 | Stop reason: HOSPADM

## 2019-06-04 RX ORDER — LISINOPRIL 20 MG/1
20 TABLET ORAL DAILY
Status: DISCONTINUED | OUTPATIENT
Start: 2019-06-04 | End: 2019-06-05 | Stop reason: HOSPADM

## 2019-06-04 RX ORDER — QUETIAPINE FUMARATE 100 MG/1
100 TABLET, FILM COATED ORAL NIGHTLY
Status: DISCONTINUED | OUTPATIENT
Start: 2019-06-04 | End: 2019-06-05 | Stop reason: HOSPADM

## 2019-06-04 RX ORDER — IPRATROPIUM BROMIDE AND ALBUTEROL SULFATE 2.5; .5 MG/3ML; MG/3ML
3 SOLUTION RESPIRATORY (INHALATION) EVERY 6 HOURS PRN
Status: DISCONTINUED | OUTPATIENT
Start: 2019-06-04 | End: 2019-06-05 | Stop reason: HOSPADM

## 2019-06-04 RX ORDER — DIVALPROEX SODIUM 500 MG/1
500 TABLET, FILM COATED, EXTENDED RELEASE ORAL NIGHTLY
Status: DISCONTINUED | OUTPATIENT
Start: 2019-06-04 | End: 2019-06-05 | Stop reason: HOSPADM

## 2019-06-04 RX ADMIN — SODIUM CHLORIDE: 0.9 INJECTION, SOLUTION INTRAVENOUS at 03:06

## 2019-06-04 RX ADMIN — IPRATROPIUM BROMIDE AND ALBUTEROL SULFATE 3 ML: .5; 3 SOLUTION RESPIRATORY (INHALATION) at 12:06

## 2019-06-04 RX ADMIN — SODIUM CHLORIDE: 0.9 INJECTION, SOLUTION INTRAVENOUS at 07:06

## 2019-06-04 RX ADMIN — LISINOPRIL 20 MG: 20 TABLET ORAL at 12:06

## 2019-06-04 RX ADMIN — QUETIAPINE FUMARATE 100 MG: 100 TABLET ORAL at 12:06

## 2019-06-04 RX ADMIN — DIVALPROEX SODIUM 500 MG: 500 TABLET, EXTENDED RELEASE ORAL at 12:06

## 2019-06-04 RX ADMIN — SODIUM CHLORIDE: 0.9 INJECTION, SOLUTION INTRAVENOUS at 09:06

## 2019-06-04 RX ADMIN — DIVALPROEX SODIUM 500 MG: 500 TABLET, EXTENDED RELEASE ORAL at 08:06

## 2019-06-04 RX ADMIN — LEVOFLOXACIN 500 MG: 5 INJECTION, SOLUTION INTRAVENOUS at 09:06

## 2019-06-04 RX ADMIN — SERTRALINE HYDROCHLORIDE 100 MG: 100 TABLET ORAL at 08:06

## 2019-06-04 RX ADMIN — PANTOPRAZOLE SODIUM 40 MG: 40 TABLET, DELAYED RELEASE ORAL at 08:06

## 2019-06-04 RX ADMIN — QUETIAPINE FUMARATE 100 MG: 100 TABLET ORAL at 08:06

## 2019-06-04 NOTE — ASSESSMENT & PLAN NOTE
BP stable  Takes lisinopril HCTZ 20/12.5  Will order plain lisinopril for now   Continue with IVFs

## 2019-06-04 NOTE — ASSESSMENT & PLAN NOTE
No history of possible aspiration  Lactate negative, WBC 13.29>7.92   Afebrile  Day 2 Levofloxacin/Rocehin day 2  pulmonary consulted- recommends continue IV Levofloxacin for now  Neb tx

## 2019-06-04 NOTE — PLAN OF CARE
Problem: Adult Inpatient Plan of Care  Goal: Plan of Care Review  Outcome: Ongoing (interventions implemented as appropriate)     06/04/19 0714   Plan of Care Review   Plan of Care Reviewed With patient   Progress no change   Patient received IV fluids and medications as ordered. No distress noted this a.m. Will monitor.

## 2019-06-04 NOTE — PLAN OF CARE
06/04/19 0925   Discharge Assessment   Assessment Type Discharge Planning Assessment   Confirmed/corrected address and phone number on facesheet? Yes   Assessment information obtained from? Patient   Expected Length of Stay (days) 1   Communicated expected length of stay with patient/caregiver yes   Prior to hospitilization cognitive status: Alert/Oriented   Prior to hospitalization functional status: Independent   Current cognitive status: Alert/Oriented   Current Functional Status: Independent   Lives With parent(s)   Able to Return to Prior Arrangements yes   Is patient able to care for self after discharge? Yes   Who are your caregiver(s) and their phone number(s)? Josefa (mother) [phone]   Patient's perception of discharge disposition home or selfcare   Readmission Within the Last 30 Days no previous admission in last 30 days   Patient currently being followed by outpatient case management? No   Patient currently receives any other outside agency services? No   Equipment Currently Used at Home none   Do you have any problems affording any of your prescribed medications? No   Is the patient taking medications as prescribed? yes   Does the patient have transportation home? Yes   Transportation Anticipated family or friend will provide   Dialysis Name and Scheduled days n/a   Does the patient receive services at the [facility]? No   Discharge Plan A Home   Discharge Plan B Home with family   DME Needed Upon Discharge  none   Patient/Family in Agreement with Plan yes         Patient admitted for UTI and PNA. She states she lives at home with her mother, and can depend on her mother to help her if needed. Patient states she is independent in daily living, and denies any needs or concerns for discharge at this time. Patient educated on diagnosis for this admission, and patient verbalizes understanding. Discharge planning brochure and educational handout of what signs and symptoms to look for after  discharge, and how to manage health at home, given to patient and family. Patient and family are encouraged to call with any questions or help the would need. Contact information given. CM will continue to follow patient throughout the transitions of care, and assist with any discharge needs.

## 2019-06-04 NOTE — ASSESSMENT & PLAN NOTE
Lactate negative, WBC 13.29>7.92   Afebrile  Day 2 Levofloxacin/Rocehin day 2  IVFs   Urine cx pending

## 2019-06-04 NOTE — HPI
43-year-old female presents to the emergency room with left flank pain yesterday am.  She reported that the pain was constant and described as stabbing.  Reports the pain radiates to her left abdomen.  Reported urinary dysuria. Denies frequency, urgency, or hematuria.  Denies fever.  CT of abd and pelvis negative for acute findings but CT/CXR lungs  demonstrated Bilateral ground-glass infiltrates consistent with history of pneumonia. Today she denies worsening cough or SOB but does have chronic sx, long time smoker.

## 2019-06-04 NOTE — CONSULTS
[facility]  Pulmonology  Consult Note    Patient Name: Trice Fink  MRN: [id number]  Admission Date: 6/3/2019  Hospital Length of Stay: 1 days  Code Status: Full Code  Attending Physician: Yeni Munoz MD  Primary Care Provider: Primary Doctor No   Principal Problem: <principal problem not specified>    Consults  Subjective:     HPI:  Trice Fink is a 43 y.o. year old female that's presents with a chief complaint of SOB and CVA tenderness with dysuria  for 14 days.Patient with a positive drug screen smokes 1 PPD for 25 years and works as a  . Consulted to evaluate Respiratory status.    Past Medical History:   Diagnosis Date    Anxiety     Bipolar 2 disorder     Pre-eclampsia         Past Surgical History:   Procedure Laterality Date    APPENDECTOMY       SECTION      FOOT SURGERY Left     HYSTERECTOMY      OVARIAN CYST REMOVAL      PARTIAL HYSTERECTOMY         Prior to Admission medications    Medication Sig Start Date End Date Taking? Authorizing Provider   divalproex (DEPAKOTE) 500 MG TbEC one tablet 18  Yes Historical Provider, MD   lisinopril-hydrochlorothiazide (PRINZIDE,ZESTORETIC) 20-12.5 mg per tablet 1 tablet 18  Yes Historical Provider, MD   QUEtiapine (SEROQUEL) 100 MG Tab Take 100 mg by mouth.   Yes Historical Provider, MD   sertraline (ZOLOFT) 100 MG tablet 1 tablet   Yes Historical Provider, MD       Social History     Socioeconomic History    Marital status: Single     Spouse name: Not on file    Number of children: Not on file    Years of education: Not on file    Highest education level: Not on file   Occupational History    Not on file   Social Needs    Financial resource strain: Not on file    Food insecurity:     Worry: Not on file     Inability: Not on file    Transportation needs:     Medical: Not on file     Non-medical: Not on file   Tobacco Use    Smoking status: Current Every Day Smoker     Packs/day: 1.00      Types: Cigarettes   Substance and Sexual Activity    Alcohol use: Yes     Comment: occasionally    Drug use: No    Sexual activity: Yes     Partners: Female     Birth control/protection: None   Lifestyle    Physical activity:     Days per week: Not on file     Minutes per session: Not on file    Stress: Not on file   Relationships    Social connections:     Talks on phone: Not on file     Gets together: Not on file     Attends Islam service: Not on file     Active member of club or organization: Not on file     Attends meetings of clubs or organizations: Not on file     Relationship status: Not on file   Other Topics Concern    Not on file   Social History Narrative    Not on file       Family History   Problem Relation Age of Onset    Hypertension Mother     Hypertension Maternal Grandmother     Cancer Father        Review of patient's allergies indicates:  No Known Allergies Allergies have been reviewed.     ROS: Review of Systems   Constitutional: Negative for chills, fever and weight loss.   HENT: Negative for congestion and sore throat.    Eyes: Negative for double vision and photophobia.   Respiratory: Positive for cough, sputum production and shortness of breath.    Cardiovascular: Positive for leg swelling (mild) and PND (occasional). Negative for palpitations.   Gastrointestinal: Negative for abdominal pain and diarrhea.   Genitourinary: Positive for dysuria, flank pain (??? pyelo), frequency and urgency. Negative for hematuria.   Musculoskeletal: Positive for myalgias (generalized). Negative for back pain and neck pain.   Skin: Negative.    Neurological: Negative for dizziness, weakness and headaches.   Endo/Heme/Allergies: Does not bruise/bleed easily.   Psychiatric/Behavioral: Negative for memory loss. The patient has insomnia (intermittant ).        PE:   Vitals:    06/04/19 0701 06/04/19 0713 06/04/19 0800 06/04/19 1000   BP: 101/66      BP Location: Left arm      Patient Position: Lying       Pulse: 63 66 80 74   Resp: 18 16     Temp: 96.9 °F (36.1 °C)      TempSrc: Oral      SpO2: 97% 99%     Weight:       Height:        Physical Exam    Alert and orientated X 3   HEENT: Head: Normocephalic no trauma                Ears : Normal Pinna No Drainage no Battles sign                Eyes: Vision Unchanged, No conjunctivitis,No drainage                Neck: Supple, No JVD,No Abnormal Carotid Pulsations                Throat: No Erythema, No pus,No Swelling,Mallampati score= 2    Chest: course BS Bilaterally with bilateral wet crackles mid lung zone clear somewhAT WITH A COUGH   Cardiac: RRR S1+ S2 with a -S3: +M = 2/6, No R/H/G  Abdomen: Bowel Sounds are Normal.Soft Abdomen. No organomegaly of Liver,Spleen,or Kidneys   CNS: Non focal and intact. Cranial nerves 2, 346,8,9,10 and 12 are normal.Norrmal gait.Normal posture.  Extremities: No Clubbing,No Cyanosis with oxygen,Positive mild edema of lower extremities Bilateral  Skin: No Rash, No Ulcerative sores,and No cellulitis of the IV site.    Lab Results   Component Value Date    WBC 7.92 06/04/2019    HGB 11.4 (L) 06/04/2019    HCT 34.4 (L) 06/04/2019     06/04/2019    ALT 9 (L) 06/04/2019    AST 12 06/04/2019     06/04/2019    K 3.6 06/04/2019     (H) 06/04/2019    CREATININE 0.7 06/04/2019    BUN 15 06/04/2019    CO2 23 06/04/2019               Assessment/Plan:     SIRS (systemic inflammatory response syndrome)  Follow CXR    Acute cystitis without hematuria  Cystitis vs pyelonephritis   Has Back pain by history  Infiltrates in the lung on CXR look like and sound like capillary Leak or ARDS(actually SIRS)without hypoxia.    Follow cxr continue Levaquin suggest to keep pt to define clinical course of an advancing or decreasing infection at this stage in her clinical course she does take barbiturates       Thank you for your consult. I will follow-up with patient. Please contact us if you have any additional questions.     Abelino Walker,  MD  Pulmonology  [facility]

## 2019-06-04 NOTE — PLAN OF CARE
Problem: Adult Inpatient Plan of Care  Goal: Plan of Care Review  Outcome: Ongoing (interventions implemented as appropriate)  Patient is compliant with fluid and medication management.  Patient is compliant with with all lab testing and procedures.  Patient remains free from all falls and injury.  IV fluids.  VSS. Plan of care reviewed and agreed upon with patient.  Will continue to monitor.

## 2019-06-04 NOTE — PLAN OF CARE
06/04/19 0924   Advance Directives (For Healthcare)   Advance Directive  (If Adv Dir status is received, view document under Code in header or Chart Review Media tab) Patient does not have Advance Directive, declines information.       Confirms full code.

## 2019-06-04 NOTE — SUBJECTIVE & OBJECTIVE
Past Medical History:   Diagnosis Date    Anxiety     Bipolar 2 disorder     Pre-eclampsia        Past Surgical History:   Procedure Laterality Date    APPENDECTOMY       SECTION      FOOT SURGERY Left     HYSTERECTOMY      OVARIAN CYST REMOVAL      PARTIAL HYSTERECTOMY         Review of patient's allergies indicates:  No Known Allergies    No current facility-administered medications on file prior to encounter.      Current Outpatient Medications on File Prior to Encounter   Medication Sig    divalproex (DEPAKOTE) 500 MG TbEC one tablet    lisinopril-hydrochlorothiazide (PRINZIDE,ZESTORETIC) 20-12.5 mg per tablet 1 tablet    QUEtiapine (SEROQUEL) 100 MG Tab Take 100 mg by mouth.    sertraline (ZOLOFT) 100 MG tablet 1 tablet     Family History     Problem Relation (Age of Onset)    Cancer Father    Hypertension Mother, Maternal Grandmother        Tobacco Use    Smoking status: Current Every Day Smoker     Packs/day: 1.00     Types: Cigarettes   Substance and Sexual Activity    Alcohol use: Yes     Comment: occasionally    Drug use: No    Sexual activity: Yes     Partners: Female     Birth control/protection: None     Review of Systems   Constitutional: Negative for chills, fatigue and fever.   HENT: Negative for congestion, ear pain, postnasal drip, sinus pressure, sneezing and sore throat.    Eyes: Negative for discharge.   Respiratory: Positive for cough (dry cough ). Negative for chest tightness and shortness of breath.    Cardiovascular: Negative.    Gastrointestinal: Negative for abdominal pain, constipation, diarrhea, nausea and vomiting.   Genitourinary: Positive for dysuria and flank pain (left). Negative for difficulty urinating and hematuria.   Musculoskeletal: Negative for arthralgias and joint swelling.   Skin: Negative.    Neurological: Negative for dizziness and headaches.   Psychiatric/Behavioral: Negative for behavioral problems and confusion.     Objective:     Vital Signs  (Most Recent):  Temp: 96.9 °F (36.1 °C) (06/04/19 0701)  Pulse: 74 (06/04/19 1000)  Resp: 16 (06/04/19 0713)  BP: 101/66 (06/04/19 0701)  SpO2: 99 % (06/04/19 0713) Vital Signs (24h Range):  Temp:  [96.4 °F (35.8 °C)-99.1 °F (37.3 °C)] 96.9 °F (36.1 °C)  Pulse:  [61-86] 74  Resp:  [16-20] 16  SpO2:  [97 %-100 %] 99 %  BP: (101-162)/(59-89) 101/66     Weight: 79.7 kg (175 lb 11.2 oz)  Body mass index is 28.36 kg/m².    Physical Exam   Constitutional: She is oriented to person, place, and time. She appears well-developed and well-nourished. No distress.   HENT:   Head: Normocephalic and atraumatic.   Right Ear: External ear normal.   Left Ear: External ear normal.   Eyes: Pupils are equal, round, and reactive to light. Conjunctivae and EOM are normal. Right eye exhibits no discharge. Left eye exhibits no discharge.   Neck: Neck supple. No tracheal deviation present.   Cardiovascular: Normal rate and regular rhythm.   No murmur heard.  Pulmonary/Chest: Effort normal and breath sounds normal. No respiratory distress. She has no wheezes.   Coarse breath sounds b/l bases   Abdominal: Soft. Bowel sounds are normal. She exhibits no distension. There is no tenderness.   Neurological: She is alert and oriented to person, place, and time. No cranial nerve deficit.   Skin: Skin is warm and dry.   Psychiatric: She has a normal mood and affect. Her behavior is normal.   Nursing note and vitals reviewed.        CRANIAL NERVES     CN III, IV, VI   Pupils are equal, round, and reactive to light.  Extraocular motions are normal.        Significant Labs:     ABGs: No results for input(s): PH, PCO2, HCO3, POCSATURATED, BE, TOTALHB, COHB, METHB, O2HB, POCFIO2 in the last 48 hours.  Bilirubin:   Recent Labs   Lab 06/03/19  1755 06/04/19  0641   BILITOT 0.3 0.2     Blood Culture: No results for input(s): LABBLOO in the last 48 hours.  CBC:   Recent Labs   Lab 06/03/19  1755 06/04/19  0641   WBC 15.29* 7.92   HGB 12.8 11.4*   HCT 37.5  34.4*    295     CMP:   Recent Labs   Lab 06/03/19  1755 06/04/19  0641    141   K 3.9 3.6    111*   CO2 23 23   GLU 94 110   BUN 9 15   CREATININE 0.7 0.7   CALCIUM 9.0 8.1*   PROT 7.2 5.5*   ALBUMIN 3.6 2.6*   BILITOT 0.3 0.2   ALKPHOS 79 66   AST 21 12   ALT 12 9*   ANIONGAP 11 7*   EGFRNONAA >60 >60     Cardiac Markers: No results for input(s): CKMB, MYOGLOBIN, BNP, TROPISTAT in the last 48 hours.  Lactic Acid:   Recent Labs   Lab 06/03/19  1940 06/03/19  2340   LACTATE 1.3 1.0     Magnesium: No results for input(s): MG in the last 48 hours.  Respiratory Culture: No results for input(s): GSRESP, RESPIRATORYC in the last 48 hours.  Troponin:   Recent Labs   Lab 06/03/19  1810   TROPONINI <0.006     Urine Studies:   Recent Labs   Lab 06/03/19  1754   COLORU Yellow   APPEARANCEUA Cloudy*   PHUR 7.0   SPECGRAV 1.025   PROTEINUA 3+*   GLUCUA Negative   KETONESU Negative   BILIRUBINUA Negative   OCCULTUA 3+*   NITRITE Positive*   UROBILINOGEN 1.0   LEUKOCYTESUR 2+*   RBCUA 60*   WBCUA >100*   BACTERIA Many*   SQUAMEPITHEL 4   HYALINECASTS 0       Significant Imaging: I have reviewed all pertinent imaging results/findings within the past 24 hours.   CT abd- pelvis No acute findings in the abdomen and pelvis.    Extensive patchy ground-glass infiltrates throughout the lower lungs concerning for pneumonia.  Follow-up after appropriate treatment is advised.  6/3/19 CXR- Bilateral ground-glass infiltrates consistent with history of pneumonia.

## 2019-06-04 NOTE — ASSESSMENT & PLAN NOTE
Cystitis vs pyelonephritis   Has Back pain by history  Infiltrates in the lung on CXR look like and sound like capillary Leak or ARDS(actually SIRS)without hypoxia.

## 2019-06-04 NOTE — H&P
[facility] Hospital Medicine  History & Physical    Patient Name: Trice Fink  MRN: [id number]  Admission Date: 6/3/2019  Attending Physician: Yeni Munoz MD   Primary Care Provider: Primary Doctor No         Patient information was obtained from patient and ER records.     Subjective:     Principal Problem:Acute cystitis without hematuria    Chief Complaint:   Chief Complaint   Patient presents with    Flank Pain     C/O LEFT FLANK PAIN THAT BEGAN ABRUPTLY THIS AM. REPORTS DYSURIA        HPI: 43-year-old female presents to the emergency room with left flank pain yesterday am.  She reported that the pain was constant and described as stabbing.  Reports the pain radiates to her left abdomen.  Report ed urinary dysuria. Denies frequency, urgency, or hematuria.  Denies fever.  CT of abd and pelvis negative for acute findings but CT/CXR lungs  demonstrated Bilateral ground-glass infiltrates consistent with history of pneumonia. Today she denies worsening cough or SOB but does have chronic sx, long time smoker.         Past Medical History:   Diagnosis Date    Anxiety     Bipolar 2 disorder     Pre-eclampsia        Past Surgical History:   Procedure Laterality Date    APPENDECTOMY       SECTION      FOOT SURGERY Left     HYSTERECTOMY      OVARIAN CYST REMOVAL      PARTIAL HYSTERECTOMY         Review of patient's allergies indicates:  No Known Allergies    No current facility-administered medications on file prior to encounter.      Current Outpatient Medications on File Prior to Encounter   Medication Sig    divalproex (DEPAKOTE) 500 MG TbEC one tablet    lisinopril-hydrochlorothiazide (PRINZIDE,ZESTORETIC) 20-12.5 mg per tablet 1 tablet    QUEtiapine (SEROQUEL) 100 MG Tab Take 100 mg by mouth.    sertraline (ZOLOFT) 100 MG tablet 1 tablet     Family History     Problem Relation (Age of Onset)    Cancer Father    Hypertension Mother, Maternal Grandmother        Tobacco  Use    Smoking status: Current Every Day Smoker     Packs/day: 1.00     Types: Cigarettes   Substance and Sexual Activity    Alcohol use: Yes     Comment: occasionally    Drug use: No    Sexual activity: Yes     Partners: Female     Birth control/protection: None     Review of Systems   Constitutional: Negative for chills, fatigue and fever.   HENT: Negative for congestion, ear pain, postnasal drip, sinus pressure, sneezing and sore throat.    Eyes: Negative for discharge.   Respiratory: Positive for cough (dry cough ). Negative for chest tightness and shortness of breath.    Cardiovascular: Negative.    Gastrointestinal: Negative for abdominal pain, constipation, diarrhea, nausea and vomiting.   Genitourinary: Positive for dysuria and flank pain (left). Negative for difficulty urinating and hematuria.   Musculoskeletal: Negative for arthralgias and joint swelling.   Skin: Negative.    Neurological: Negative for dizziness and headaches.   Psychiatric/Behavioral: Negative for behavioral problems and confusion.     Objective:     Vital Signs (Most Recent):  Temp: 96.9 °F (36.1 °C) (06/04/19 0701)  Pulse: 74 (06/04/19 1000)  Resp: 16 (06/04/19 0713)  BP: 101/66 (06/04/19 0701)  SpO2: 99 % (06/04/19 0713) Vital Signs (24h Range):  Temp:  [96.4 °F (35.8 °C)-99.1 °F (37.3 °C)] 96.9 °F (36.1 °C)  Pulse:  [61-86] 74  Resp:  [16-20] 16  SpO2:  [97 %-100 %] 99 %  BP: (101-162)/(59-89) 101/66     Weight: 79.7 kg (175 lb 11.2 oz)  Body mass index is 28.36 kg/m².    Physical Exam   Constitutional: She is oriented to person, place, and time. She appears well-developed and well-nourished. No distress.   HENT:   Head: Normocephalic and atraumatic.   Right Ear: External ear normal.   Left Ear: External ear normal.   Eyes: Pupils are equal, round, and reactive to light. Conjunctivae and EOM are normal. Right eye exhibits no discharge. Left eye exhibits no discharge.   Neck: Neck supple. No tracheal deviation present.    Cardiovascular: Normal rate and regular rhythm.   No murmur heard.  Pulmonary/Chest: Effort normal and breath sounds normal. No respiratory distress. She has no wheezes.   Coarse breath sounds b/l bases   Abdominal: Soft. Bowel sounds are normal. She exhibits no distension. There is no tenderness.   Neurological: She is alert and oriented to person, place, and time. No cranial nerve deficit.   Skin: Skin is warm and dry.   Psychiatric: She has a normal mood and affect. Her behavior is normal.   Nursing note and vitals reviewed.        CRANIAL NERVES     CN III, IV, VI   Pupils are equal, round, and reactive to light.  Extraocular motions are normal.        Significant Labs:     ABGs: No results for input(s): PH, PCO2, HCO3, POCSATURATED, BE, TOTALHB, COHB, METHB, O2HB, POCFIO2 in the last 48 hours.  Bilirubin:   Recent Labs   Lab 06/03/19 1755 06/04/19  0641   BILITOT 0.3 0.2     Blood Culture: No results for input(s): LABBLOO in the last 48 hours.  CBC:   Recent Labs   Lab 06/03/19 1755 06/04/19  0641   WBC 15.29* 7.92   HGB 12.8 11.4*   HCT 37.5 34.4*    295     CMP:   Recent Labs   Lab 06/03/19 1755 06/04/19  0641    141   K 3.9 3.6    111*   CO2 23 23   GLU 94 110   BUN 9 15   CREATININE 0.7 0.7   CALCIUM 9.0 8.1*   PROT 7.2 5.5*   ALBUMIN 3.6 2.6*   BILITOT 0.3 0.2   ALKPHOS 79 66   AST 21 12   ALT 12 9*   ANIONGAP 11 7*   EGFRNONAA >60 >60     Cardiac Markers: No results for input(s): CKMB, MYOGLOBIN, BNP, TROPISTAT in the last 48 hours.  Lactic Acid:   Recent Labs   Lab 06/03/19  1940 06/03/19  2340   LACTATE 1.3 1.0     Magnesium: No results for input(s): MG in the last 48 hours.  Respiratory Culture: No results for input(s): GSRESP, RESPIRATORYC in the last 48 hours.  Troponin:   Recent Labs   Lab 06/03/19  1810   TROPONINI <0.006     Urine Studies:   Recent Labs   Lab 06/03/19  6046   COLORU Yellow   APPEARANCEUA Cloudy*   PHUR 7.0   SPECGRAV 1.025   PROTEINUA 3+*   GLUCUA Negative    KETONESU Negative   BILIRUBINUA Negative   OCCULTUA 3+*   NITRITE Positive*   UROBILINOGEN 1.0   LEUKOCYTESUR 2+*   RBCUA 60*   WBCUA >100*   BACTERIA Many*   SQUAMEPITHEL 4   HYALINECASTS 0       Significant Imaging: I have reviewed all pertinent imaging results/findings within the past 24 hours.   CT abd- pelvis No acute findings in the abdomen and pelvis.    Extensive patchy ground-glass infiltrates throughout the lower lungs concerning for pneumonia.  Follow-up after appropriate treatment is advised.  6/3/19 CXR- Bilateral ground-glass infiltrates consistent with history of pneumonia.    Assessment/Plan:     * Acute cystitis without hematuria    Lactate negative, WBC 13.29>7.92   Afebrile  Day 2 Levofloxacin/Rocehin day 2  IVFs   Urine cx pending      Substance abuse    + UDS - cocaine , barbiturate     Essential hypertension  BP stable  Takes lisinopril HCTZ 20/12.5  Will order plain lisinopril for now   Continue with IVFs      Bipolar depression    Continue Quetiapine, Sertralene, Divalproex     Pneumonia of both lungs due to infectious organism  No history of possible aspiration  Lactate negative, WBC 13.29>7.92   Afebrile  Day 2 Levofloxacin/Rocehin day 2  pulmonary consulted- recommends continue IV Levofloxacin for now  Neb tx     SIRS (systemic inflammatory response syndrome)          VTE Risk Mitigation (From admission, onward)        Ordered     IP VTE LOW RISK PATIENT  Once      06/03/19 2157     Place sequential compression device  Until discontinued      06/03/19 2157             Courtney Valente MD  Department of Hospital Medicine   [facility]

## 2019-06-05 VITALS
WEIGHT: 175.69 LBS | TEMPERATURE: 98 F | OXYGEN SATURATION: 98 % | BODY MASS INDEX: 28.23 KG/M2 | HEART RATE: 60 BPM | DIASTOLIC BLOOD PRESSURE: 55 MMHG | RESPIRATION RATE: 18 BRPM | SYSTOLIC BLOOD PRESSURE: 112 MMHG | HEIGHT: 66 IN

## 2019-06-05 LAB
ANION GAP SERPL CALC-SCNC: 9 MMOL/L (ref 8–16)
BASOPHILS # BLD AUTO: 0.05 K/UL (ref 0–0.2)
BASOPHILS NFR BLD: 0.6 % (ref 0–1.9)
BUN SERPL-MCNC: 9 MG/DL (ref 6–20)
CALCIUM SERPL-MCNC: 8.3 MG/DL (ref 8.7–10.5)
CHLORIDE SERPL-SCNC: 110 MMOL/L (ref 95–110)
CO2 SERPL-SCNC: 23 MMOL/L (ref 23–29)
CREAT SERPL-MCNC: 0.7 MG/DL (ref 0.5–1.4)
DIFFERENTIAL METHOD: ABNORMAL
EOSINOPHIL # BLD AUTO: 0.2 K/UL (ref 0–0.5)
EOSINOPHIL NFR BLD: 3 % (ref 0–8)
ERYTHROCYTE [DISTWIDTH] IN BLOOD BY AUTOMATED COUNT: 16.3 % (ref 11.5–14.5)
EST. GFR  (AFRICAN AMERICAN): >60 ML/MIN/1.73 M^2
EST. GFR  (NON AFRICAN AMERICAN): >60 ML/MIN/1.73 M^2
GLUCOSE SERPL-MCNC: 84 MG/DL (ref 70–110)
HCT VFR BLD AUTO: 35.9 % (ref 37–48.5)
HGB BLD-MCNC: 11.9 G/DL (ref 12–16)
LYMPHOCYTES # BLD AUTO: 2.7 K/UL (ref 1–4.8)
LYMPHOCYTES NFR BLD: 33.1 % (ref 18–48)
MCH RBC QN AUTO: 30.3 PG (ref 27–31)
MCHC RBC AUTO-ENTMCNC: 33.1 G/DL (ref 32–36)
MCV RBC AUTO: 91 FL (ref 82–98)
MONOCYTES # BLD AUTO: 0.6 K/UL (ref 0.3–1)
MONOCYTES NFR BLD: 7.4 % (ref 4–15)
NEUTROPHILS # BLD AUTO: 4.5 K/UL (ref 1.8–7.7)
NEUTROPHILS NFR BLD: 55.9 % (ref 38–73)
PLATELET # BLD AUTO: 305 K/UL (ref 150–350)
PMV BLD AUTO: 10.1 FL (ref 9.2–12.9)
POTASSIUM SERPL-SCNC: 3.9 MMOL/L (ref 3.5–5.1)
RBC # BLD AUTO: 3.93 M/UL (ref 4–5.4)
SODIUM SERPL-SCNC: 142 MMOL/L (ref 136–145)
WBC # BLD AUTO: 8.1 K/UL (ref 3.9–12.7)

## 2019-06-05 PROCEDURE — 80048 BASIC METABOLIC PNL TOTAL CA: CPT

## 2019-06-05 PROCEDURE — 96376 TX/PRO/DX INJ SAME DRUG ADON: CPT

## 2019-06-05 PROCEDURE — 25000003 PHARM REV CODE 250: Performed by: SURGERY

## 2019-06-05 PROCEDURE — 99238 PR HOSPITAL DISCHARGE DAY,<30 MIN: ICD-10-PCS | Mod: ,,, | Performed by: FAMILY MEDICINE

## 2019-06-05 PROCEDURE — 85025 COMPLETE CBC W/AUTO DIFF WBC: CPT

## 2019-06-05 PROCEDURE — 94760 N-INVAS EAR/PLS OXIMETRY 1: CPT

## 2019-06-05 PROCEDURE — 36415 COLL VENOUS BLD VENIPUNCTURE: CPT

## 2019-06-05 PROCEDURE — 96361 HYDRATE IV INFUSION ADD-ON: CPT

## 2019-06-05 PROCEDURE — 99238 HOSP IP/OBS DSCHRG MGMT 30/<: CPT | Mod: ,,, | Performed by: FAMILY MEDICINE

## 2019-06-05 PROCEDURE — G0378 HOSPITAL OBSERVATION PER HR: HCPCS

## 2019-06-05 PROCEDURE — 25000003 PHARM REV CODE 250: Performed by: INTERNAL MEDICINE

## 2019-06-05 PROCEDURE — 25000003 PHARM REV CODE 250: Performed by: NURSE PRACTITIONER

## 2019-06-05 RX ORDER — LEVOFLOXACIN 500 MG/1
500 TABLET, FILM COATED ORAL DAILY
Qty: 7 TABLET | Refills: 0 | Status: SHIPPED | OUTPATIENT
Start: 2019-06-05 | End: 2019-07-08

## 2019-06-05 RX ADMIN — SERTRALINE HYDROCHLORIDE 100 MG: 100 TABLET ORAL at 09:06

## 2019-06-05 RX ADMIN — PANTOPRAZOLE SODIUM 40 MG: 40 TABLET, DELAYED RELEASE ORAL at 09:06

## 2019-06-05 RX ADMIN — SODIUM CHLORIDE: 0.9 INJECTION, SOLUTION INTRAVENOUS at 05:06

## 2019-06-05 RX ADMIN — LISINOPRIL 20 MG: 20 TABLET ORAL at 09:06

## 2019-06-05 NOTE — PLAN OF CARE
06/05/19 1018   Final Note   Assessment Type Final Discharge Note   Anticipated Discharge Disposition Home   What phone number can be called within the next 1-3 days to see how you are doing after discharge? [phone]   Hospital Follow Up  Appt(s) scheduled? Yes   Discharge plans and expectations educations in teach back method with documentation complete? Yes   Right Care Referral Info   Post Acute Recommendation No Care         Patient will discharge home today. Family will provide transportation. She will receive medications via bedside delivery before discharge. No post acute care needs identified for discharge. Patient reminded about what signs and symptoms to look for when discharged, and educated that if any symptoms return, make a same day appointment with PCP or come back to the ED. Patient states understanding and agreement. Patient denies any other needs or concerns for discharge.

## 2019-06-05 NOTE — SUBJECTIVE & OBJECTIVE
Past Medical History:   Diagnosis Date    Anxiety     Bipolar 2 disorder     Pre-eclampsia        Past Surgical History:   Procedure Laterality Date    APPENDECTOMY       SECTION      FOOT SURGERY Left     HYSTERECTOMY      OVARIAN CYST REMOVAL      PARTIAL HYSTERECTOMY         Review of patient's allergies indicates:  No Known Allergies    No current facility-administered medications on file prior to encounter.      Current Outpatient Medications on File Prior to Encounter   Medication Sig    divalproex (DEPAKOTE) 500 MG TbEC one tablet    lisinopril-hydrochlorothiazide (PRINZIDE,ZESTORETIC) 20-12.5 mg per tablet 1 tablet    QUEtiapine (SEROQUEL) 100 MG Tab Take 100 mg by mouth.    sertraline (ZOLOFT) 100 MG tablet 1 tablet     Family History     Problem Relation (Age of Onset)    Cancer Father    Hypertension Mother, Maternal Grandmother        Tobacco Use    Smoking status: Current Every Day Smoker     Packs/day: 1.00     Types: Cigarettes   Substance and Sexual Activity    Alcohol use: Yes     Comment: occasionally    Drug use: No    Sexual activity: Yes     Partners: Female     Birth control/protection: None     Review of Systems   Constitutional: Negative for chills, fatigue and fever.   HENT: Negative for congestion, ear pain, postnasal drip, sinus pressure, sneezing and sore throat.    Eyes: Negative for discharge.   Respiratory: Positive for cough (dry cough ). Negative for chest tightness and shortness of breath.    Cardiovascular: Negative.    Gastrointestinal: Negative for abdominal pain, constipation, diarrhea, nausea and vomiting.   Genitourinary: Positive for dysuria and flank pain (left). Negative for difficulty urinating and hematuria.   Musculoskeletal: Negative for arthralgias and joint swelling.   Skin: Negative.    Neurological: Negative for dizziness and headaches.   Psychiatric/Behavioral: Negative for behavioral problems and confusion.     Objective:     Vital Signs  (Most Recent):  Temp: 98.2 °F (36.8 °C) (06/05/19 0708)  Pulse: 61 (06/05/19 0828)  Resp: 18 (06/05/19 0708)  BP: (!) 112/55 (06/05/19 0708)  SpO2: 98 % (06/05/19 0710) Vital Signs (24h Range):  Temp:  [97 °F (36.1 °C)-98.7 °F (37.1 °C)] 98.2 °F (36.8 °C)  Pulse:  [50-74] 61  Resp:  [16-18] 18  SpO2:  [96 %-100 %] 98 %  BP: (107-122)/(55-67) 112/55     Weight: 79.7 kg (175 lb 11.2 oz)  Body mass index is 28.36 kg/m².    Physical Exam   Constitutional: She is oriented to person, place, and time. She appears well-developed and well-nourished. No distress.   HENT:   Head: Normocephalic and atraumatic.   Right Ear: External ear normal.   Left Ear: External ear normal.   Eyes: Pupils are equal, round, and reactive to light. Conjunctivae and EOM are normal. Right eye exhibits no discharge. Left eye exhibits no discharge.   Neck: Neck supple. No tracheal deviation present.   Cardiovascular: Normal rate and regular rhythm.   No murmur heard.  Pulmonary/Chest: Effort normal and breath sounds normal. No respiratory distress. She has no wheezes.   Abdominal: Soft. Bowel sounds are normal. She exhibits no distension. There is no tenderness.   Neurological: She is alert and oriented to person, place, and time. No cranial nerve deficit.   Skin: Skin is warm and dry.   Psychiatric: She has a normal mood and affect. Her behavior is normal.   Nursing note and vitals reviewed.        CRANIAL NERVES     CN III, IV, VI   Pupils are equal, round, and reactive to light.  Extraocular motions are normal.        Significant Labs:     ABGs: No results for input(s): PH, PCO2, HCO3, POCSATURATED, BE, TOTALHB, COHB, METHB, O2HB, POCFIO2 in the last 48 hours.  Bilirubin:   Recent Labs   Lab 06/03/19  1755 06/04/19  0641   BILITOT 0.3 0.2     Blood Culture:   Recent Labs   Lab 06/03/19  1940   LABBLOO No Growth to date  No Growth to date  No Growth to date  No Growth to date     CBC:   Recent Labs   Lab 06/03/19  1755 06/04/19  0641  06/05/19  0759   WBC 15.29* 7.92 8.10   HGB 12.8 11.4* 11.9*   HCT 37.5 34.4* 35.9*    295 305     CMP:   Recent Labs   Lab 06/03/19  1755 06/04/19  0641 06/05/19  0759    141 142   K 3.9 3.6 3.9    111* 110   CO2 23 23 23   GLU 94 110 84   BUN 9 15 9   CREATININE 0.7 0.7 0.7   CALCIUM 9.0 8.1* 8.3*   PROT 7.2 5.5*  --    ALBUMIN 3.6 2.6*  --    BILITOT 0.3 0.2  --    ALKPHOS 79 66  --    AST 21 12  --    ALT 12 9*  --    ANIONGAP 11 7* 9   EGFRNONAA >60 >60 >60     Cardiac Markers: No results for input(s): CKMB, MYOGLOBIN, BNP, TROPISTAT in the last 48 hours.  Lactic Acid:   Recent Labs   Lab 06/03/19  1940 06/03/19  2340   LACTATE 1.3 1.0     Magnesium: No results for input(s): MG in the last 48 hours.  Respiratory Culture: No results for input(s): GSRESP, RESPIRATORYC in the last 48 hours.  Troponin:   Recent Labs   Lab 06/03/19  1810   TROPONINI <0.006     Urine Studies:   Recent Labs   Lab 06/03/19  1754   COLORU Yellow   APPEARANCEUA Cloudy*   PHUR 7.0   SPECGRAV 1.025   PROTEINUA 3+*   GLUCUA Negative   KETONESU Negative   BILIRUBINUA Negative   OCCULTUA 3+*   NITRITE Positive*   UROBILINOGEN 1.0   LEUKOCYTESUR 2+*   RBCUA 60*   WBCUA >100*   BACTERIA Many*   SQUAMEPITHEL 4   HYALINECASTS 0     Urine Culture, Routine PRESUMPTIVE E COLI   >100,000 cfu/ml   Identification and susceptibility pending             Significant Imaging: I have reviewed all pertinent imaging results/findings within the past 24 hours.   CT abd- pelvis No acute findings in the abdomen and pelvis.    Extensive patchy ground-glass infiltrates throughout the lower lungs concerning for pneumonia.  Follow-up after appropriate treatment is advised.  6/3/19 CXR- Bilateral ground-glass infiltrates consistent with history of pneumonia.

## 2019-06-05 NOTE — PROGRESS NOTES
[facility]  Pulmonology  Progress Note    Patient Name: Trice Fink  MRN: [id number]  Admission Date: 6/3/2019  Hospital Length of Stay: 1 days  Code Status: Full Code  Attending Provider: No att. providers found  Primary Care Provider: Primary Doctor No   Principal Problem: Acute cystitis without hematuria    Subjective:     Interval History: looks great ready to go home and take care of her 6 dogs    Objective:     Vital Signs (Most Recent):  Temp: 98.2 °F (36.8 °C) (06/05/19 0708)  Pulse: 60 (06/05/19 1118)  Resp: 18 (06/05/19 0708)  BP: (!) 112/55 (06/05/19 0708)  SpO2: 98 % (06/05/19 0710) Vital Signs (24h Range):  Temp:  [97 °F (36.1 °C)-98.7 °F (37.1 °C)] 98.2 °F (36.8 °C)  Pulse:  [50-70] 60  Resp:  [16-18] 18  SpO2:  [96 %-100 %] 98 %  BP: (107-122)/(55-67) 112/55     Weight: 79.7 kg (175 lb 11.2 oz)  Body mass index is 28.36 kg/m².      Intake/Output Summary (Last 24 hours) at 6/5/2019 1339  Last data filed at 6/5/2019 1130  Gross per 24 hour   Intake 4608 ml   Output 2 ml   Net 4606 ml       Physical Exam   Constitutional: She is oriented to person, place, and time. She appears well-developed and well-nourished. She is cooperative.  Non-toxic appearance. She does not appear ill. No distress.   HENT:   Head: Normocephalic and atraumatic.   Right Ear: Hearing, tympanic membrane, external ear and ear canal normal.   Left Ear: Hearing, tympanic membrane, external ear and ear canal normal.   Nose: Rhinorrhea present. No mucosal edema or nasal deformity. No epistaxis. Right sinus exhibits no maxillary sinus tenderness and no frontal sinus tenderness. Left sinus exhibits no maxillary sinus tenderness and no frontal sinus tenderness.   Mouth/Throat: Uvula is midline, oropharynx is clear and moist and mucous membranes are normal. No trismus in the jaw. Normal dentition. No uvula swelling. No posterior oropharyngeal erythema.   Eyes: Conjunctivae and lids are normal. No scleral icterus.    Sclera clear bilat   Neck: Trachea normal, full passive range of motion without pain and phonation normal. Neck supple.   Cardiovascular: Normal rate, regular rhythm, normal heart sounds, intact distal pulses and normal pulses.   Pulmonary/Chest: She is in respiratory distress (mild to moderate). She has decreased breath sounds in the right lower field. She has no wheezes. She has rhonchi in the right middle field and the right lower field.           Abdominal: Soft. Normal appearance and bowel sounds are normal. She exhibits no distension. There is no tenderness.   Musculoskeletal: Normal range of motion. She exhibits no edema or deformity.   Neurological: She is alert and oriented to person, place, and time. She exhibits normal muscle tone. Coordination normal.   Skin: Skin is warm, dry and intact. She is not diaphoretic. No pallor.   Psychiatric: She has a normal mood and affect. Her speech is normal and behavior is normal. Judgment and thought content normal. Cognition and memory are normal.   Nursing note and vitals reviewed.      Vents:       Lines/Drains/Airways          None          Significant Labs:    CBC/Anemia Profile:  Recent Labs   Lab 06/03/19  1755 06/04/19  0641 06/05/19  0759   WBC 15.29* 7.92 8.10   HGB 12.8 11.4* 11.9*   HCT 37.5 34.4* 35.9*    295 305   MCV 90 92 91   RDW 16.2* 16.7* 16.3*        Chemistries:  Recent Labs   Lab 06/03/19  1755 06/04/19  0641 06/05/19  0759    141 142   K 3.9 3.6 3.9    111* 110   CO2 23 23 23   BUN 9 15 9   CREATININE 0.7 0.7 0.7   CALCIUM 9.0 8.1* 8.3*   ALBUMIN 3.6 2.6*  --    PROT 7.2 5.5*  --    BILITOT 0.3 0.2  --    ALKPHOS 79 66  --    ALT 12 9*  --    AST 21 12  --        All pertinent labs within the past 24 hours have been reviewed.    Significant Imaging:  I have reviewed all pertinent imaging results/findings within the past 24 hours.    Assessment/Plan:     * Acute cystitis without hematuria  Cystitis vs pyelonephritis   Has  Back pain by history  Infiltrates in the lung on CXR look like and sound like capillary Leak or ARDS(actually SIRS)without hypoxia.    Substance abuse  Using cocaine    Pneumonia of both lungs due to infectious organism  Probable aspiration lateralized to RLL was drinking and using cocaine to excess just prior to admission     SIRS (systemic inflammatory response syndrome)  Follow CXR           Abelino Walker MD  Pulmonology  [facility]

## 2019-06-05 NOTE — ASSESSMENT & PLAN NOTE
Probable aspiration lateralized to RLL was drinking and using cocaine to excess just prior to admission

## 2019-06-05 NOTE — DISCHARGE SUMMARY
[facility] Hospital Medicine  Discharge Summary      Patient Name: Trice Fink  MRN: [id number]  Admission Date: 6/3/2019  Hospital Length of Stay: 1 days  Discharge Date and Time:  06/05/2019 10:59 AM  Attending Physician: Yeni Munoz MD   Discharging Provider: Asha Johnson NP  Primary Care Provider: Primary Doctor No      HPI:   43-year-old female presents to the emergency room with left flank pain yesterday am.  She reported that the pain was constant and described as stabbing.  Reports the pain radiates to her left abdomen.  Report ed urinary dysuria. Denies frequency, urgency, or hematuria.  Denies fever.  CT of abd and pelvis negative for acute findings but CT/CXR lungs  demonstrated Bilateral ground-glass infiltrates consistent with history of pneumonia. Today she denies worsening cough or SOB but does have chronic sx, long time smoker.         * No surgery found *      Hospital Course:   6/5/19 NAD overnight; afebrile, WBC nml  Day 3 Levofloxacin for UTI, Pneumonia   Deferred neb tx yesterday, did not feel like she needed   O2 sat 98% on RA, afebrile, BC NGTD x 2d, urine   Urine Culture, Routine PRESUMPTIVE E COLI   >100,000 cfu/ml   Identification and susceptibility pending            Consults:   Consults (From admission, onward)        Status Ordering Provider     Inpatient consult to Pulmonology  Once     Provider:  Abelino Walker MD    Acknowledged CAROL[place] RHODES          * Acute cystitis without hematuria    Lactate negative, WBC 13.29>7.92   Afebrile  Day 3 Levofloxacin  IVFs   Urine cx pending- presumptive Ecoli- sensitivity pending- will d/c home with Levofloxacin for 7 more days   F/u as OP       Substance abuse  She denies chronic use; counseled   + UDS - cocaine , barbiturate     Essential hypertension  BP stable  Takes lisinopril HCTZ 20/12.5  Will order plain lisinopril for now   Continue with IVFs      Bipolar depression    Continue Quetiapine, Sertralene,  Divalproex     Pneumonia of both lungs due to infectious organism  No history of possible aspiration  Lactate negative, WBC 13.29>7.92   Afebrile  Day 3 Levofloxacin  pulmonary consulted- recommends continue IV Levofloxacin for now  Neb tx PRN  Continue IV levofloxacin      Final Active Diagnoses:    Diagnosis Date Noted POA    PRINCIPAL PROBLEM:  Acute cystitis without hematuria [N30.00] 06/03/2019 Yes    Pneumonia of both lungs due to infectious organism [J18.9] 06/04/2019 Yes    Bipolar depression [F31.30] 06/04/2019 Yes    Essential hypertension [I10] 06/04/2019 Yes    Substance abuse [F19.10] 06/04/2019 Yes      Problems Resolved During this Admission:       Discharged Condition: stable    Disposition: Home or Self Care    Follow Up:  Follow-up Information     Schedule an appointment as soon as possible for a visit with Primary Doctor No.    Why:  1 week                Patient Instructions:   No discharge procedures on file.    Significant Diagnostic Studies:   Recent Labs   Lab 06/03/19 1755 06/04/19 0641   BILITOT 0.3 0.2      Blood Culture:       Recent Labs   Lab 06/03/19 1940   LABBLOO No Growth to date  No Growth to date  No Growth to date  No Growth to date      CBC:         Recent Labs   Lab 06/03/19 1755 06/04/19  0641 06/05/19  0759   WBC 15.29* 7.92 8.10   HGB 12.8 11.4* 11.9*   HCT 37.5 34.4* 35.9*    295 305      CMP:         Recent Labs   Lab 06/03/19 1755 06/04/19  0641 06/05/19  0759    141 142   K 3.9 3.6 3.9    111* 110   CO2 23 23 23   GLU 94 110 84   BUN 9 15 9   CREATININE 0.7 0.7 0.7   CALCIUM 9.0 8.1* 8.3*   PROT 7.2 5.5*  --    ALBUMIN 3.6 2.6*  --    BILITOT 0.3 0.2  --    ALKPHOS 79 66  --    AST 21 12  --    ALT 12 9*  --    ANIONGAP 11 7* 9   EGFRNONAA >60 >60 >60      Cardiac Markers: No results for input(s): CKMB, MYOGLOBIN, BNP, TROPISTAT in the last 48 hours.  Lactic Acid:        Recent Labs   Lab 06/03/19 1940 06/03/19  2340   LACTATE 1.3 1.0       Magnesium: No results for input(s): MG in the last 48 hours.  Respiratory Culture: No results for input(s): GSRESP, RESPIRATORYC in the last 48 hours.  Troponin:       Recent Labs   Lab 06/03/19  1810   TROPONINI <0.006      Urine Studies:       Recent Labs   Lab 06/03/19  1754   COLORU Yellow   APPEARANCEUA Cloudy*   PHUR 7.0   SPECGRAV 1.025   PROTEINUA 3+*   GLUCUA Negative   KETONESU Negative   BILIRUBINUA Negative   OCCULTUA 3+*   NITRITE Positive*   UROBILINOGEN 1.0   LEUKOCYTESUR 2+*   RBCUA 60*   WBCUA >100*   BACTERIA Many*   SQUAMEPITHEL 4   HYALINECASTS 0      Urine Culture, Routine PRESUMPTIVE E COLI   >100,000 cfu/ml   Identification and susceptibility pending                 Pending Diagnostic Studies:     None         Medications:  Reconciled Home Medications:      Medication List      START taking these medications    levoFLOXacin 500 MG tablet  Commonly known as:  LEVAQUIN  Take 1 tablet (500 mg total) by mouth once daily.        CONTINUE taking these medications    DEPAKOTE 500 MG Tbec  Generic drug:  divalproex  one tablet     lisinopril-hydrochlorothiazide 20-12.5 mg per tablet  Commonly known as:  PRINZIDE,ZESTORETIC  1 tablet     QUEtiapine 100 MG Tab  Commonly known as:  SEROQUEL  Take 100 mg by mouth.     ZOLOFT 100 MG tablet  Generic drug:  sertraline  1 tablet            Indwelling Lines/Drains at time of discharge:   Lines/Drains/Airways          None          Time spent on the discharge of patient: 15 minutes  Patient was seen and examined on the date of discharge and determined to be suitable for discharge.         Asha Johnson NP  Department of Hospital Medicine  [facility]

## 2019-06-05 NOTE — ASSESSMENT & PLAN NOTE
No history of possible aspiration  Lactate negative, WBC 13.29>7.92   Afebrile  Day 3 Levofloxacin  pulmonary consulted- recommends continue IV Levofloxacin for now  Neb tx PRN  Continue IV levofloxacin

## 2019-06-05 NOTE — HOSPITAL COURSE
6/5/19 NAD overnight; afebrile, WBC nml  Day 3 Levofloxacin for UTI, Pneumonia   Deferred neb tx yesterday, did not feel like she needed   O2 sat 98% on RA, afebrile, BC NGTD x 2d, urine   Urine Culture, Routine PRESUMPTIVE E COLI   >100,000 cfu/ml   Identification and susceptibility pending

## 2019-06-05 NOTE — PROGRESS NOTES
[facility] Medicine  Progress Note    Patient Name: Trice Fink  MRN: [id number]  Patient Class: OP- Observation   Admission Date: 6/3/2019  Length of Stay: 1 days  Attending Physician: Yeni Munoz MD  Primary Care Provider: Primary Doctor No        Subjective:     Principal Problem:Acute cystitis without hematuria    HPI:  43-year-old female presents to the emergency room with left flank pain yesterday am.  She reported that the pain was constant and described as stabbing.  Reports the pain radiates to her left abdomen.  Report ed urinary dysuria. Denies frequency, urgency, or hematuria.  Denies fever.  CT of abd and pelvis negative for acute findings but CT/CXR lungs  demonstrated Bilateral ground-glass infiltrates consistent with history of pneumonia. Today she denies worsening cough or SOB but does have chronic sx, long time smoker.         Hospital Course:  19 NAD overnight; afebrile, WBC nml  Day 3 Levofloxacin for UTI, Pneumonia   Deferred neb tx yesterday, did not feel like she needed   O2 sat 98% on RA, afebrile, BC NGTD x 2d, urine   Urine Culture, Routine PRESUMPTIVE E COLI   >100,000 cfu/ml   Identification and susceptibility pending           Past Medical History:   Diagnosis Date    Anxiety     Bipolar 2 disorder     Pre-eclampsia        Past Surgical History:   Procedure Laterality Date    APPENDECTOMY       SECTION      FOOT SURGERY Left     HYSTERECTOMY      OVARIAN CYST REMOVAL      PARTIAL HYSTERECTOMY         Review of patient's allergies indicates:  No Known Allergies    No current facility-administered medications on file prior to encounter.      Current Outpatient Medications on File Prior to Encounter   Medication Sig    divalproex (DEPAKOTE) 500 MG TbEC one tablet    lisinopril-hydrochlorothiazide (PRINZIDE,ZESTORETIC) 20-12.5 mg per tablet 1 tablet    QUEtiapine (SEROQUEL) 100 MG Tab Take 100 mg by mouth.    sertraline (ZOLOFT)  100 MG tablet 1 tablet     Family History     Problem Relation (Age of Onset)    Cancer Father    Hypertension Mother, Maternal Grandmother        Tobacco Use    Smoking status: Current Every Day Smoker     Packs/day: 1.00     Types: Cigarettes   Substance and Sexual Activity    Alcohol use: Yes     Comment: occasionally    Drug use: No    Sexual activity: Yes     Partners: Female     Birth control/protection: None     Review of Systems   Constitutional: Negative for chills, fatigue and fever.   HENT: Negative for congestion, ear pain, postnasal drip, sinus pressure, sneezing and sore throat.    Eyes: Negative for discharge.   Respiratory: Positive for cough (dry cough ). Negative for chest tightness and shortness of breath.    Cardiovascular: Negative.    Gastrointestinal: Negative for abdominal pain, constipation, diarrhea, nausea and vomiting.   Genitourinary: Positive for dysuria and flank pain (left). Negative for difficulty urinating and hematuria.   Musculoskeletal: Negative for arthralgias and joint swelling.   Skin: Negative.    Neurological: Negative for dizziness and headaches.   Psychiatric/Behavioral: Negative for behavioral problems and confusion.     Objective:     Vital Signs (Most Recent):  Temp: 98.2 °F (36.8 °C) (06/05/19 0708)  Pulse: 61 (06/05/19 0828)  Resp: 18 (06/05/19 0708)  BP: (!) 112/55 (06/05/19 0708)  SpO2: 98 % (06/05/19 0710) Vital Signs (24h Range):  Temp:  [97 °F (36.1 °C)-98.7 °F (37.1 °C)] 98.2 °F (36.8 °C)  Pulse:  [50-74] 61  Resp:  [16-18] 18  SpO2:  [96 %-100 %] 98 %  BP: (107-122)/(55-67) 112/55     Weight: 79.7 kg (175 lb 11.2 oz)  Body mass index is 28.36 kg/m².    Physical Exam   Constitutional: She is oriented to person, place, and time. She appears well-developed and well-nourished. No distress.   HENT:   Head: Normocephalic and atraumatic.   Right Ear: External ear normal.   Left Ear: External ear normal.   Eyes: Pupils are equal, round, and reactive to light.  Conjunctivae and EOM are normal. Right eye exhibits no discharge. Left eye exhibits no discharge.   Neck: Neck supple. No tracheal deviation present.   Cardiovascular: Normal rate and regular rhythm.   No murmur heard.  Pulmonary/Chest: Effort normal and breath sounds normal. No respiratory distress. She has no wheezes.   Abdominal: Soft. Bowel sounds are normal. She exhibits no distension. There is no tenderness.   Neurological: She is alert and oriented to person, place, and time. No cranial nerve deficit.   Skin: Skin is warm and dry.   Psychiatric: She has a normal mood and affect. Her behavior is normal.   Nursing note and vitals reviewed.        CRANIAL NERVES     CN III, IV, VI   Pupils are equal, round, and reactive to light.  Extraocular motions are normal.        Significant Labs:     ABGs: No results for input(s): PH, PCO2, HCO3, POCSATURATED, BE, TOTALHB, COHB, METHB, O2HB, POCFIO2 in the last 48 hours.  Bilirubin:   Recent Labs   Lab 06/03/19  1755 06/04/19  0641   BILITOT 0.3 0.2     Blood Culture:   Recent Labs   Lab 06/03/19  1940   LABBLOO No Growth to date  No Growth to date  No Growth to date  No Growth to date     CBC:   Recent Labs   Lab 06/03/19  1755 06/04/19  0641 06/05/19  0759   WBC 15.29* 7.92 8.10   HGB 12.8 11.4* 11.9*   HCT 37.5 34.4* 35.9*    295 305     CMP:   Recent Labs   Lab 06/03/19  1755 06/04/19  0641 06/05/19  0759    141 142   K 3.9 3.6 3.9    111* 110   CO2 23 23 23   GLU 94 110 84   BUN 9 15 9   CREATININE 0.7 0.7 0.7   CALCIUM 9.0 8.1* 8.3*   PROT 7.2 5.5*  --    ALBUMIN 3.6 2.6*  --    BILITOT 0.3 0.2  --    ALKPHOS 79 66  --    AST 21 12  --    ALT 12 9*  --    ANIONGAP 11 7* 9   EGFRNONAA >60 >60 >60     Cardiac Markers: No results for input(s): CKMB, MYOGLOBIN, BNP, TROPISTAT in the last 48 hours.  Lactic Acid:   Recent Labs   Lab 06/03/19 1940 06/03/19  2340   LACTATE 1.3 1.0     Magnesium: No results for input(s): MG in the last 48  hours.  Respiratory Culture: No results for input(s): GSRESP, RESPIRATORYC in the last 48 hours.  Troponin:   Recent Labs   Lab 06/03/19  1810   TROPONINI <0.006     Urine Studies:   Recent Labs   Lab 06/03/19  1754   COLORU Yellow   APPEARANCEUA Cloudy*   PHUR 7.0   SPECGRAV 1.025   PROTEINUA 3+*   GLUCUA Negative   KETONESU Negative   BILIRUBINUA Negative   OCCULTUA 3+*   NITRITE Positive*   UROBILINOGEN 1.0   LEUKOCYTESUR 2+*   RBCUA 60*   WBCUA >100*   BACTERIA Many*   SQUAMEPITHEL 4   HYALINECASTS 0     Urine Culture, Routine PRESUMPTIVE E COLI   >100,000 cfu/ml   Identification and susceptibility pending             Significant Imaging: I have reviewed all pertinent imaging results/findings within the past 24 hours.   CT abd- pelvis No acute findings in the abdomen and pelvis.    Extensive patchy ground-glass infiltrates throughout the lower lungs concerning for pneumonia.  Follow-up after appropriate treatment is advised.  6/3/19 CXR- Bilateral ground-glass infiltrates consistent with history of pneumonia.    Assessment/Plan:      * Acute cystitis without hematuria    Lactate negative, WBC 13.29>7.92   Afebrile  Day 3 Levofloxacin  IVFs   Urine cx pending- presumptive Ecoli- sensitivity pending- will d/c home with Levofloxacin for 7 more days   F/u as OP       Substance abuse  She denies chronic use; counseled   + UDS - cocaine , barbiturate     Essential hypertension  BP stable  Takes lisinopril HCTZ 20/12.5  Will order plain lisinopril for now   Continue with IVFs      Bipolar depression    Continue Quetiapine, Sertralene, Divalproex     Pneumonia of both lungs due to infectious organism  No history of possible aspiration  Lactate negative, WBC 13.29>7.92   Afebrile  Day 3 Levofloxacin  pulmonary consulted- recommends continue IV Levofloxacin for now  Neb tx PRN  Continue IV levofloxacin    SIRS (systemic inflammatory response syndrome)          VTE Risk Mitigation (From admission, onward)        Ordered      IP VTE LOW RISK PATIENT  Once      06/03/19 2157     Place sequential compression device  Until discontinued      06/03/19 2157              Seymour Lazo MD  Department of Hospital Medicine   [facility]

## 2019-06-05 NOTE — ASSESSMENT & PLAN NOTE
Lactate negative, WBC 13.29>7.92   Afebrile  Day 3 Levofloxacin  IVFs   Urine cx pending- presumptive Ecoli- sensitivity pending- will d/c home with Levofloxacin for 7 more days   F/u as OP

## 2019-06-05 NOTE — PROGRESS NOTES
Staff Handoff  Bedside handoff report received from Faith BROWN. POC discussed. Will monitor.      Resident Handoff

## 2019-06-05 NOTE — PLAN OF CARE
Problem: Adult Inpatient Plan of Care  Goal: Plan of Care Review  Outcome: Ongoing (interventions implemented as appropriate)     06/05/19 0516   Plan of Care Review   Plan of Care Reviewed With patient   Progress improving   Patient received IV antibiotics, IV fluids, and medications as ordered. No adverse events throughout the night. Will monitor.  Goal: Patient-Specific Goal (Individualization)  Outcome: Ongoing (interventions implemented as appropriate)     06/05/19 0516   Patient-Specific Goal (Individualization)   Patient-Specific Goals (Include Timeframe) No temperatures or complaints overnight.   OTHER   Anxieties, Fears or Concerns No concerns verbalized overnight. Rested well.   Individualized Care Needs Patient instructed on POC.

## 2019-06-05 NOTE — SUBJECTIVE & OBJECTIVE
Interval History: looks great ready to go home and take care of her 6 dogs    Objective:     Vital Signs (Most Recent):  Temp: 98.2 °F (36.8 °C) (06/05/19 0708)  Pulse: 60 (06/05/19 1118)  Resp: 18 (06/05/19 0708)  BP: (!) 112/55 (06/05/19 0708)  SpO2: 98 % (06/05/19 0710) Vital Signs (24h Range):  Temp:  [97 °F (36.1 °C)-98.7 °F (37.1 °C)] 98.2 °F (36.8 °C)  Pulse:  [50-70] 60  Resp:  [16-18] 18  SpO2:  [96 %-100 %] 98 %  BP: (107-122)/(55-67) 112/55     Weight: 79.7 kg (175 lb 11.2 oz)  Body mass index is 28.36 kg/m².      Intake/Output Summary (Last 24 hours) at 6/5/2019 1339  Last data filed at 6/5/2019 1130  Gross per 24 hour   Intake 4608 ml   Output 2 ml   Net 4606 ml       Physical Exam   Constitutional: She is oriented to person, place, and time. She appears well-developed and well-nourished. She is cooperative.  Non-toxic appearance. She does not appear ill. No distress.   HENT:   Head: Normocephalic and atraumatic.   Right Ear: Hearing, tympanic membrane, external ear and ear canal normal.   Left Ear: Hearing, tympanic membrane, external ear and ear canal normal.   Nose: Rhinorrhea present. No mucosal edema or nasal deformity. No epistaxis. Right sinus exhibits no maxillary sinus tenderness and no frontal sinus tenderness. Left sinus exhibits no maxillary sinus tenderness and no frontal sinus tenderness.   Mouth/Throat: Uvula is midline, oropharynx is clear and moist and mucous membranes are normal. No trismus in the jaw. Normal dentition. No uvula swelling. No posterior oropharyngeal erythema.   Eyes: Conjunctivae and lids are normal. No scleral icterus.   Sclera clear bilat   Neck: Trachea normal, full passive range of motion without pain and phonation normal. Neck supple.   Cardiovascular: Normal rate, regular rhythm, normal heart sounds, intact distal pulses and normal pulses.   Pulmonary/Chest: She is in respiratory distress (mild to moderate). She has decreased breath sounds in the right lower  field. She has no wheezes. She has rhonchi in the right middle field and the right lower field.           Abdominal: Soft. Normal appearance and bowel sounds are normal. She exhibits no distension. There is no tenderness.   Musculoskeletal: Normal range of motion. She exhibits no edema or deformity.   Neurological: She is alert and oriented to person, place, and time. She exhibits normal muscle tone. Coordination normal.   Skin: Skin is warm, dry and intact. She is not diaphoretic. No pallor.   Psychiatric: She has a normal mood and affect. Her speech is normal and behavior is normal. Judgment and thought content normal. Cognition and memory are normal.   Nursing note and vitals reviewed.      Vents:       Lines/Drains/Airways          None          Significant Labs:    CBC/Anemia Profile:  Recent Labs   Lab 06/03/19 1755 06/04/19  0641 06/05/19  0759   WBC 15.29* 7.92 8.10   HGB 12.8 11.4* 11.9*   HCT 37.5 34.4* 35.9*    295 305   MCV 90 92 91   RDW 16.2* 16.7* 16.3*        Chemistries:  Recent Labs   Lab 06/03/19 1755 06/04/19  0641 06/05/19  0759    141 142   K 3.9 3.6 3.9    111* 110   CO2 23 23 23   BUN 9 15 9   CREATININE 0.7 0.7 0.7   CALCIUM 9.0 8.1* 8.3*   ALBUMIN 3.6 2.6*  --    PROT 7.2 5.5*  --    BILITOT 0.3 0.2  --    ALKPHOS 79 66  --    ALT 12 9*  --    AST 21 12  --        All pertinent labs within the past 24 hours have been reviewed.    Significant Imaging:  I have reviewed all pertinent imaging results/findings within the past 24 hours.

## 2019-06-05 NOTE — NURSING
Patient discharged.  NADN.  IV d/c'd.  Catheter tip intact.  Pressure dressing applied.  Verbal and written discharge instructions and instructed to make an appointment with physician of her choice in a week.  Prescription delivered to bedside.  Patient verbalized understanding.  Patient transported to [facility], wheelchair refused, escorted by transport staff.

## 2019-06-06 LAB — BACTERIA UR CULT: NORMAL

## 2019-06-09 LAB
BACTERIA BLD CULT: NORMAL
BACTERIA BLD CULT: NORMAL

## 2019-07-08 ENCOUNTER — OFFICE VISIT (OUTPATIENT)
Dept: FAMILY MEDICINE | Facility: HOSPITAL | Age: 44
End: 2019-07-08
Payer: MEDICAID

## 2019-07-08 VITALS
HEART RATE: 76 BPM | SYSTOLIC BLOOD PRESSURE: 81 MMHG | TEMPERATURE: 97 F | HEIGHT: 66 IN | BODY MASS INDEX: 25.51 KG/M2 | DIASTOLIC BLOOD PRESSURE: 56 MMHG | WEIGHT: 158.75 LBS

## 2019-07-08 DIAGNOSIS — I95.9 HYPOTENSION, UNSPECIFIED HYPOTENSION TYPE: ICD-10-CM

## 2019-07-08 DIAGNOSIS — R56.9 SEIZURES: ICD-10-CM

## 2019-07-08 DIAGNOSIS — G43.719 INTRACTABLE CHRONIC MIGRAINE WITHOUT AURA AND WITHOUT STATUS MIGRAINOSUS: Primary | ICD-10-CM

## 2019-07-08 PROCEDURE — 99214 OFFICE O/P EST MOD 30 MIN: CPT | Performed by: STUDENT IN AN ORGANIZED HEALTH CARE EDUCATION/TRAINING PROGRAM

## 2019-07-08 RX ORDER — QUETIAPINE FUMARATE 25 MG/1
25 TABLET, FILM COATED ORAL NIGHTLY PRN
COMMUNITY

## 2019-07-08 RX ORDER — IBUPROFEN 800 MG/1
800 TABLET ORAL
COMMUNITY
Start: 2017-12-01 | End: 2019-07-08 | Stop reason: SDUPTHER

## 2019-07-08 RX ORDER — CETIRIZINE HYDROCHLORIDE 10 MG/1
TABLET ORAL
COMMUNITY
Start: 2018-08-01 | End: 2019-08-05

## 2019-07-08 RX ORDER — LISINOPRIL AND HYDROCHLOROTHIAZIDE 12.5; 2 MG/1; MG/1
TABLET ORAL
COMMUNITY
End: 2019-08-05

## 2019-07-08 RX ORDER — KETOROLAC TROMETHAMINE 10 MG/1
10 TABLET, FILM COATED ORAL
COMMUNITY
Start: 2018-05-03 | End: 2019-08-05

## 2019-07-08 RX ORDER — ACETAMINOPHEN 500 MG
5 TABLET ORAL NIGHTLY PRN
COMMUNITY

## 2019-07-08 RX ORDER — HYDROXYZINE PAMOATE 25 MG/1
CAPSULE ORAL
COMMUNITY
Start: 2018-07-24 | End: 2019-08-05

## 2019-07-08 RX ORDER — DIVALPROEX SODIUM 500 MG/1
500 TABLET, DELAYED RELEASE ORAL
COMMUNITY
End: 2019-08-05

## 2019-07-08 RX ORDER — SERTRALINE HYDROCHLORIDE 50 MG/1
TABLET, FILM COATED ORAL
COMMUNITY
Start: 2018-05-03 | End: 2019-07-08

## 2019-07-08 RX ORDER — NALTREXONE HYDROCHLORIDE 50 MG/1
TABLET, FILM COATED ORAL
Refills: 1 | COMMUNITY
Start: 2019-05-01 | End: 2019-08-05

## 2019-07-08 RX ORDER — QUETIAPINE FUMARATE 50 MG/1
50 TABLET, FILM COATED ORAL
COMMUNITY
Start: 2018-07-24 | End: 2019-07-08

## 2019-07-08 RX ORDER — PHENOL/SODIUM PHENOLATE
20 AEROSOL, SPRAY (ML) MUCOUS MEMBRANE DAILY
Qty: 30 EACH | Refills: 11 | Status: SHIPPED | OUTPATIENT
Start: 2019-07-08 | End: 2023-07-27

## 2019-07-08 RX ORDER — IBUPROFEN 800 MG/1
800 TABLET ORAL EVERY 8 HOURS PRN
Qty: 30 TABLET | Refills: 5 | Status: SHIPPED | OUTPATIENT
Start: 2019-07-08

## 2019-07-08 RX ORDER — ONDANSETRON HYDROCHLORIDE 8 MG/1
8 TABLET, FILM COATED ORAL EVERY 8 HOURS PRN
Qty: 30 TABLET | Refills: 0 | Status: SHIPPED | OUTPATIENT
Start: 2019-07-08 | End: 2019-12-30 | Stop reason: SDUPTHER

## 2019-07-08 RX ORDER — ONDANSETRON 4 MG/1
TABLET, ORALLY DISINTEGRATING ORAL
COMMUNITY
Start: 2018-08-01 | End: 2019-08-05

## 2019-07-08 NOTE — PROGRESS NOTES
"Subjective:       Patient ID: Trice Fink is a 43 y.o. female.    Chief Complaint: Hypotension    Patient is a 44 yo female pmhx of seizures, migraines, bipolar II presenting today with multiple concerns.     Tension headache  - history of migraine headaches  - reports shoulder tightness leading to neck and head pain. Leads to migraines  - takes ibuprofen daily    Hypotension   - hx of hypertension for past few years  - lost 25 pounds, eating healthier  - continues to take antihypertensives  - denies syncope or LOC  - lightheadedness when changing positions or standing    Seizure hx  - seizures since age 18 due to heavy illicit drug use  - maintained on depakote  - no longer uses illicit drugs  - would like referral to neurologist  - reports episodes of "shocks of lightening" that start in her head and radiate throughout body into her feet.  Episodes occur randomly. Sometimes 1 episode per month.  Sometimes up to 10 episodes in 1 day.  Occurring for months    Review of Systems   Constitutional: Negative for appetite change, fatigue and fever.   HENT: Negative for congestion and sore throat.    Respiratory: Negative for cough, chest tightness and shortness of breath.    Cardiovascular: Negative for chest pain and palpitations.   Gastrointestinal: Negative for abdominal pain, diarrhea and nausea.   Musculoskeletal: Negative for back pain, neck pain and neck stiffness.   Neurological: Positive for dizziness, weakness, light-headedness and headaches.       Objective:      Vitals:    07/08/19 0920   BP: (!) 81/56   Pulse: 76   Temp: 97.2 °F (36.2 °C)     Physical Exam   Constitutional: She is oriented to person, place, and time. She appears well-developed and well-nourished. No distress.   HENT:   Head: Normocephalic and atraumatic.   Mouth/Throat: No oropharyngeal exudate.   Eyes: Pupils are equal, round, and reactive to light. EOM are normal. No scleral icterus.   Neck: Normal range of motion. Neck supple. "   Cardiovascular: Normal rate, regular rhythm and normal heart sounds.   No murmur heard.  Pulmonary/Chest: Effort normal. No respiratory distress.   Abdominal: Soft. She exhibits no distension.   Musculoskeletal: Normal range of motion.   Neurological: She is alert and oriented to person, place, and time. No cranial nerve deficit. She exhibits normal muscle tone. Coordination normal.   Skin: Skin is warm and dry. Capillary refill takes less than 2 seconds. She is not diaphoretic.   Nursing note and vitals reviewed.      Assessment:       1. Intractable chronic migraine without aura and without status migrainosus    2. Hypotension, unspecified hypotension type    3. Seizures        Plan:       Intractable chronic migraine without aura and without status migrainosus  -     Ambulatory referral to Neurology    Hypotension, unspecified hypotension type        -     Will discontinue antihypertensives today        -     Encouraged to monitor BP at home        -     Will consider further intervention based on logs     Seizures  -     Ambulatory referral to Neurology    Other orders  -     ondansetron (ZOFRAN) 8 MG tablet; Take 1 tablet (8 mg total) by mouth every 8 (eight) hours as needed for Nausea.  Dispense: 30 tablet; Refill: 0  -     omeprazole 20 mg TbEC; Take 20 mg by mouth once daily.  Dispense: 30 each; Refill: 11  -     ibuprofen (ADVIL,MOTRIN) 800 MG tablet; Take 1 tablet (800 mg total) by mouth every 8 (eight) hours as needed for Pain.  Dispense: 30 tablet; Refill: 5      Follow up in about 1 month (around 8/5/2019).

## 2019-07-09 NOTE — PROGRESS NOTES
I have reviewed the notes, assessments, and/or procedures performed, I concur with her/his documentation of Trice Fink.

## 2019-07-22 ENCOUNTER — TELEPHONE (OUTPATIENT)
Dept: FAMILY MEDICINE | Facility: HOSPITAL | Age: 44
End: 2019-07-22

## 2019-07-22 ENCOUNTER — DOCUMENTATION ONLY (OUTPATIENT)
Dept: FAMILY MEDICINE | Facility: HOSPITAL | Age: 44
End: 2019-07-22

## 2019-07-22 ENCOUNTER — PATIENT MESSAGE (OUTPATIENT)
Dept: FAMILY MEDICINE | Facility: HOSPITAL | Age: 44
End: 2019-07-22

## 2019-07-22 DIAGNOSIS — Z00.00 ANNUAL PHYSICAL EXAM: Primary | ICD-10-CM

## 2019-07-22 DIAGNOSIS — R56.9 SEIZURES: ICD-10-CM

## 2019-08-05 ENCOUNTER — LAB VISIT (OUTPATIENT)
Dept: LAB | Facility: HOSPITAL | Age: 44
End: 2019-08-05
Attending: STUDENT IN AN ORGANIZED HEALTH CARE EDUCATION/TRAINING PROGRAM
Payer: MEDICAID

## 2019-08-05 ENCOUNTER — OFFICE VISIT (OUTPATIENT)
Dept: FAMILY MEDICINE | Facility: HOSPITAL | Age: 44
End: 2019-08-05
Attending: FAMILY MEDICINE
Payer: MEDICAID

## 2019-08-05 VITALS
DIASTOLIC BLOOD PRESSURE: 75 MMHG | BODY MASS INDEX: 26.61 KG/M2 | SYSTOLIC BLOOD PRESSURE: 129 MMHG | WEIGHT: 165.56 LBS | HEART RATE: 63 BPM | HEIGHT: 66 IN

## 2019-08-05 DIAGNOSIS — G40.909 SEIZURE DISORDER: ICD-10-CM

## 2019-08-05 DIAGNOSIS — R56.9 SEIZURES: ICD-10-CM

## 2019-08-05 DIAGNOSIS — Z00.00 ANNUAL PHYSICAL EXAM: ICD-10-CM

## 2019-08-05 DIAGNOSIS — I95.2 HYPOTENSION DUE TO DRUGS: Primary | ICD-10-CM

## 2019-08-05 DIAGNOSIS — Z72.0 TOBACCO ABUSE: ICD-10-CM

## 2019-08-05 PROBLEM — J18.9 PNEUMONIA OF BOTH LUNGS DUE TO INFECTIOUS ORGANISM: Status: RESOLVED | Noted: 2019-06-04 | Resolved: 2019-08-05

## 2019-08-05 PROBLEM — N30.00 ACUTE CYSTITIS WITHOUT HEMATURIA: Status: RESOLVED | Noted: 2019-06-03 | Resolved: 2019-08-05

## 2019-08-05 PROBLEM — R65.10 SIRS (SYSTEMIC INFLAMMATORY RESPONSE SYNDROME): Status: RESOLVED | Noted: 2019-06-04 | Resolved: 2019-08-05

## 2019-08-05 PROBLEM — I10 ESSENTIAL HYPERTENSION: Status: RESOLVED | Noted: 2019-06-04 | Resolved: 2019-08-05

## 2019-08-05 LAB
ALBUMIN SERPL BCP-MCNC: 3.5 G/DL (ref 3.5–5.2)
ALP SERPL-CCNC: 58 U/L (ref 55–135)
ALT SERPL W/O P-5'-P-CCNC: 5 U/L (ref 10–44)
ANION GAP SERPL CALC-SCNC: 6 MMOL/L (ref 8–16)
AST SERPL-CCNC: 12 U/L (ref 10–40)
BASOPHILS # BLD AUTO: 0.06 K/UL (ref 0–0.2)
BASOPHILS NFR BLD: 1 % (ref 0–1.9)
BILIRUB SERPL-MCNC: 0.1 MG/DL (ref 0.1–1)
BUN SERPL-MCNC: 19 MG/DL (ref 6–20)
CALCIUM SERPL-MCNC: 9.2 MG/DL (ref 8.7–10.5)
CHLORIDE SERPL-SCNC: 104 MMOL/L (ref 95–110)
CHOLEST SERPL-MCNC: 197 MG/DL (ref 120–199)
CHOLEST/HDLC SERPL: 4.2 {RATIO} (ref 2–5)
CO2 SERPL-SCNC: 30 MMOL/L (ref 23–29)
CREAT SERPL-MCNC: 0.8 MG/DL (ref 0.5–1.4)
DIFFERENTIAL METHOD: ABNORMAL
EOSINOPHIL # BLD AUTO: 0.3 K/UL (ref 0–0.5)
EOSINOPHIL NFR BLD: 4.1 % (ref 0–8)
ERYTHROCYTE [DISTWIDTH] IN BLOOD BY AUTOMATED COUNT: 14.8 % (ref 11.5–14.5)
EST. GFR  (AFRICAN AMERICAN): >60 ML/MIN/1.73 M^2
EST. GFR  (NON AFRICAN AMERICAN): >60 ML/MIN/1.73 M^2
GLUCOSE SERPL-MCNC: 83 MG/DL (ref 70–110)
HCT VFR BLD AUTO: 40 % (ref 37–48.5)
HDLC SERPL-MCNC: 47 MG/DL (ref 40–75)
HDLC SERPL: 23.9 % (ref 20–50)
HGB BLD-MCNC: 13 G/DL (ref 12–16)
LDLC SERPL CALC-MCNC: 127.4 MG/DL (ref 63–159)
LYMPHOCYTES # BLD AUTO: 2.2 K/UL (ref 1–4.8)
LYMPHOCYTES NFR BLD: 35.7 % (ref 18–48)
MCH RBC QN AUTO: 30.7 PG (ref 27–31)
MCHC RBC AUTO-ENTMCNC: 32.5 G/DL (ref 32–36)
MCV RBC AUTO: 95 FL (ref 82–98)
MONOCYTES # BLD AUTO: 0.4 K/UL (ref 0.3–1)
MONOCYTES NFR BLD: 7 % (ref 4–15)
NEUTROPHILS # BLD AUTO: 3.3 K/UL (ref 1.8–7.7)
NEUTROPHILS NFR BLD: 52.2 % (ref 38–73)
NONHDLC SERPL-MCNC: 150 MG/DL
PLATELET # BLD AUTO: 305 K/UL (ref 150–350)
PMV BLD AUTO: 9.8 FL (ref 9.2–12.9)
POTASSIUM SERPL-SCNC: 5.2 MMOL/L (ref 3.5–5.1)
PROT SERPL-MCNC: 6.9 G/DL (ref 6–8.4)
RBC # BLD AUTO: 4.23 M/UL (ref 4–5.4)
SODIUM SERPL-SCNC: 140 MMOL/L (ref 136–145)
TRIGL SERPL-MCNC: 113 MG/DL (ref 30–150)
TSH SERPL DL<=0.005 MIU/L-ACNC: 0.79 UIU/ML (ref 0.4–4)
VALPROATE SERPL-MCNC: 51.9 UG/ML (ref 50–100)
WBC # BLD AUTO: 6.28 K/UL (ref 3.9–12.7)

## 2019-08-05 PROCEDURE — 84443 ASSAY THYROID STIM HORMONE: CPT

## 2019-08-05 PROCEDURE — 36415 COLL VENOUS BLD VENIPUNCTURE: CPT

## 2019-08-05 PROCEDURE — 80061 LIPID PANEL: CPT

## 2019-08-05 PROCEDURE — 99213 OFFICE O/P EST LOW 20 MIN: CPT | Performed by: STUDENT IN AN ORGANIZED HEALTH CARE EDUCATION/TRAINING PROGRAM

## 2019-08-05 PROCEDURE — 85025 COMPLETE CBC W/AUTO DIFF WBC: CPT

## 2019-08-05 PROCEDURE — 80053 COMPREHEN METABOLIC PANEL: CPT

## 2019-08-05 PROCEDURE — 80164 ASSAY DIPROPYLACETIC ACD TOT: CPT

## 2019-08-05 RX ORDER — VARENICLINE TARTRATE 0.5 (11)-1
KIT ORAL
Qty: 53 TABLET | Refills: 0 | Status: SHIPPED | OUTPATIENT
Start: 2019-08-05 | End: 2019-10-09 | Stop reason: DRUGHIGH

## 2019-08-05 NOTE — PROGRESS NOTES
Subjective:       Patient ID: Trice Fink is a 43 y.o. female.    Chief Complaint: Hypotension follow-up     Patient is a 44 yo female pmhx of hypotension (resolved), seizures, migraines, bipolar II presenting today for follow-up.  At last visit, lisinopril and HCTZ were discontinued due to hypotension.  SBP at home has been 120- 135.      Patient reports concern of generalized body aches and tiredness.  Has been occurring the past few weeks.  Works outside [employer].     Interested in tobacco cessation and would like prescription for chantix.    Review of Systems   Constitutional: Negative for appetite change and fever.   HENT: Negative for congestion and sore throat.    Respiratory: Positive for shortness of breath. Negative for cough and chest tightness.    Cardiovascular: Negative for chest pain and palpitations.   Gastrointestinal: Negative for abdominal pain, diarrhea and nausea.   Musculoskeletal: Negative for back pain, neck pain and neck stiffness.   Neurological: Negative for dizziness, weakness and headaches.       Objective:      Vitals:    08/05/19 1329   BP: 129/75   Pulse: 63     Physical Exam   Constitutional: She is oriented to person, place, and time. She appears well-developed and well-nourished. No distress.   HENT:   Head: Normocephalic and atraumatic.   Mouth/Throat: No oropharyngeal exudate.   Eyes: Pupils are equal, round, and reactive to light. EOM are normal. No scleral icterus.   Neck: Normal range of motion.   Cardiovascular: Normal rate, regular rhythm and normal heart sounds.   No murmur heard.  Pulmonary/Chest: Effort normal. No respiratory distress.   Abdominal: Soft. She exhibits no distension.   Musculoskeletal: Normal range of motion. She exhibits no edema.   Neurological: She is alert and oriented to person, place, and time. No cranial nerve deficit. She exhibits normal muscle tone. Coordination normal.   Skin: Skin is warm and dry. Capillary refill takes less than 2  seconds. She is not diaphoretic.   Nursing note and vitals reviewed.      Assessment:       1. Hypotension due to drugs    2. Tobacco abuse    3. Seizure disorder        Plan:       Hypotension due to drugs  - resolved  - discontinued all antihypertensives    Tobacco abuse  -     Complete PFT with bronchodilator; Future  -     PULSE OXIMETRY WITH REST - PULM; Future  -     varenicline (CHANTIX STARTING MONTH BOX) 0.5 mg (11)- 1 mg (42) tablet; Take one 0.5mg tab by mouth once daily X3 days,then increase to one 0.5mg tab twice daily X4 days,then increase to one 1mg tab twice daily  Dispense: 53 tablet; Refill: 0    Seizure disorder   - continue depakote   - await neurology appt    Follow up in about 3 months (around 11/5/2019).

## 2019-08-14 DIAGNOSIS — Z72.0 TOBACCO ABUSE: Primary | ICD-10-CM

## 2019-10-08 ENCOUNTER — OFFICE VISIT (OUTPATIENT)
Dept: URGENT CARE | Facility: CLINIC | Age: 44
End: 2019-10-08
Payer: MEDICAID

## 2019-10-08 ENCOUNTER — PATIENT MESSAGE (OUTPATIENT)
Dept: FAMILY MEDICINE | Facility: HOSPITAL | Age: 44
End: 2019-10-08

## 2019-10-08 VITALS
SYSTOLIC BLOOD PRESSURE: 126 MMHG | TEMPERATURE: 100 F | HEART RATE: 78 BPM | BODY MASS INDEX: 26.52 KG/M2 | WEIGHT: 165 LBS | OXYGEN SATURATION: 98 % | HEIGHT: 66 IN | RESPIRATION RATE: 18 BRPM | DIASTOLIC BLOOD PRESSURE: 61 MMHG

## 2019-10-08 DIAGNOSIS — T81.49XA SURGICAL SITE INFECTION: Primary | ICD-10-CM

## 2019-10-08 DIAGNOSIS — R51.9 NONINTRACTABLE EPISODIC HEADACHE, UNSPECIFIED HEADACHE TYPE: ICD-10-CM

## 2019-10-08 PROCEDURE — 73630 XR FOOT COMPLETE 3 VIEW LEFT: ICD-10-PCS | Mod: FY,LT,S$GLB, | Performed by: RADIOLOGY

## 2019-10-08 PROCEDURE — 73630 X-RAY EXAM OF FOOT: CPT | Mod: FY,LT,S$GLB, | Performed by: RADIOLOGY

## 2019-10-08 PROCEDURE — 99214 PR OFFICE/OUTPT VISIT, EST, LEVL IV, 30-39 MIN: ICD-10-PCS | Mod: S$GLB,,, | Performed by: PHYSICIAN ASSISTANT

## 2019-10-08 PROCEDURE — 99214 OFFICE O/P EST MOD 30 MIN: CPT | Mod: S$GLB,,, | Performed by: PHYSICIAN ASSISTANT

## 2019-10-08 RX ORDER — CLINDAMYCIN PHOSPHATE 150 MG/ML
600 INJECTION, SOLUTION INTRAVENOUS
Status: COMPLETED | OUTPATIENT
Start: 2019-10-08 | End: 2019-10-08

## 2019-10-08 RX ORDER — CLINDAMYCIN HYDROCHLORIDE 300 MG/1
300 CAPSULE ORAL EVERY 6 HOURS
Qty: 28 CAPSULE | Refills: 0 | Status: SHIPPED | OUTPATIENT
Start: 2019-10-08 | End: 2019-10-15

## 2019-10-08 RX ORDER — CETIRIZINE HYDROCHLORIDE 10 MG/1
TABLET ORAL
COMMUNITY
Start: 2018-08-01 | End: 2023-07-21

## 2019-10-08 RX ORDER — TRAZODONE HYDROCHLORIDE 50 MG/1
TABLET ORAL
COMMUNITY
Start: 2019-01-09 | End: 2023-03-22

## 2019-10-08 RX ORDER — BUTALBITAL, ACETAMINOPHEN AND CAFFEINE 50; 325; 40 MG/1; MG/1; MG/1
TABLET ORAL
COMMUNITY
Start: 2018-08-01 | End: 2019-10-09 | Stop reason: DRUGHIGH

## 2019-10-08 RX ORDER — ONDANSETRON 4 MG/1
TABLET, ORALLY DISINTEGRATING ORAL
COMMUNITY
Start: 2018-05-31 | End: 2019-10-09 | Stop reason: DRUGHIGH

## 2019-10-08 RX ORDER — BUTALBITAL, ACETAMINOPHEN AND CAFFEINE 50; 325; 40 MG/1; MG/1; MG/1
1 TABLET ORAL EVERY 6 HOURS PRN
Qty: 10 TABLET | Refills: 0 | Status: SHIPPED | OUTPATIENT
Start: 2019-10-08 | End: 2019-10-09 | Stop reason: DRUGHIGH

## 2019-10-08 RX ORDER — HYDROXYZINE PAMOATE 25 MG/1
25 CAPSULE ORAL
COMMUNITY
Start: 2018-07-24

## 2019-10-08 RX ORDER — PROMETHAZINE HYDROCHLORIDE AND DEXTROMETHORPHAN HYDROBROMIDE 6.25; 15 MG/5ML; MG/5ML
SYRUP ORAL
COMMUNITY
Start: 2019-02-08 | End: 2023-03-24

## 2019-10-08 RX ORDER — HYDROCODONE BITARTRATE AND ACETAMINOPHEN 5; 325 MG/1; MG/1
TABLET ORAL
Refills: 0 | COMMUNITY
Start: 2019-08-30 | End: 2023-03-22

## 2019-10-08 RX ORDER — DIVALPROEX SODIUM 500 MG/1
TABLET, DELAYED RELEASE ORAL
COMMUNITY
Start: 2018-02-21 | End: 2023-03-22

## 2019-10-08 RX ORDER — IBUPROFEN 200 MG
TABLET ORAL
COMMUNITY
Start: 2019-01-09 | End: 2023-03-22

## 2019-10-08 RX ORDER — LISINOPRIL AND HYDROCHLOROTHIAZIDE 10; 12.5 MG/1; MG/1
1 TABLET ORAL DAILY
COMMUNITY

## 2019-10-08 RX ORDER — AMOXICILLIN AND CLAVULANATE POTASSIUM 500; 125 MG/1; MG/1
TABLET, FILM COATED ORAL
COMMUNITY
Start: 2019-02-08 | End: 2019-10-08

## 2019-10-08 RX ORDER — DIVALPROEX SODIUM 500 MG/1
500 TABLET, DELAYED RELEASE ORAL 2 TIMES DAILY
Refills: 5 | COMMUNITY
Start: 2019-09-16 | End: 2023-03-22

## 2019-10-08 RX ADMIN — CLINDAMYCIN PHOSPHATE 600 MG: 150 INJECTION, SOLUTION INTRAVENOUS at 08:10

## 2019-10-09 RX ORDER — VARENICLINE TARTRATE 1 MG/1
1 TABLET, FILM COATED ORAL 2 TIMES DAILY
Qty: 60 TABLET | Refills: 1 | Status: SHIPPED | OUTPATIENT
Start: 2019-10-09 | End: 2023-03-22

## 2019-10-09 RX ORDER — ONDANSETRON 8 MG/1
8 TABLET, ORALLY DISINTEGRATING ORAL EVERY 12 HOURS PRN
Qty: 30 TABLET | Refills: 1 | Status: SHIPPED | OUTPATIENT
Start: 2019-10-09

## 2019-10-09 NOTE — PROGRESS NOTES
"Subjective:       Patient ID: Trice Fink is a 43 y.o. female.    Vitals:  height is 5' 6" (1.676 m) and weight is 74.8 kg (165 lb). Her temperature is 99.7 °F (37.6 °C). Her blood pressure is 126/61 and her pulse is 78. Her respiration is 18 and oxygen saturation is 98%.     Chief Complaint: Wound Check    Ms. Fink presents for wound check left foot.  She had a left 5th metatarsal hardware revision on 8/30/19 at [facility].  She was seen by her surgeon approximately 2 weeks ago & the remainder of her sutures were removed.  Since then, she has had dehiscence of her wound & drainage.  Yesterday, she started having worsening redness of the surgical site.  She has not contacted her surgeon.  She denies any fevers/chills. She also reports consistent headaches & requests a refill of fiorcet.  She has had no changes in headaches, N/V, weakness, paresthesias.     Wound Check   She was originally treated 10 to 14 days ago. Previous treatment included wound cleansing or irrigation. The maximum temperature noted was less than 100.4 F. There has been colored discharge from the wound. The redness has not changed. The swelling has not changed. The pain has not changed. There is difficulty moving the extremity or digit due to pain.       Constitution: Negative for chills, fatigue and fever.   HENT: Negative for congestion and sore throat.    Neck: Negative for painful lymph nodes.   Cardiovascular: Negative for chest pain and leg swelling.   Eyes: Negative for double vision and blurred vision.   Respiratory: Negative for cough and shortness of breath.    Gastrointestinal: Negative for nausea, vomiting and diarrhea.   Genitourinary: Negative for dysuria, frequency, urgency and history of kidney stones.   Musculoskeletal: Negative for joint pain, joint swelling, muscle cramps and muscle ache.   Skin: Positive for wound and erythema. Negative for color change, pale, rash and bruising.   Allergic/Immunologic: Negative for seasonal " allergies.   Neurological: Negative for dizziness, history of vertigo, light-headedness, passing out and headaches.   Hematologic/Lymphatic: Negative for swollen lymph nodes.   Psychiatric/Behavioral: Negative for nervous/anxious, sleep disturbance and depression. The patient is not nervous/anxious.        Objective:      Physical Exam   Constitutional: She is oriented to person, place, and time. She appears well-developed and well-nourished. She is cooperative.  Non-toxic appearance. She does not appear ill. No distress.   HENT:   Head: Normocephalic and atraumatic.   Right Ear: Hearing and external ear normal.   Left Ear: Hearing and external ear normal.   Nose: Nose normal. No rhinorrhea or nasal deformity. No epistaxis.   Mouth/Throat: Mucous membranes are normal.   Eyes: Conjunctivae and lids are normal. Right eye exhibits no discharge. Left eye exhibits no discharge. No scleral icterus.   Neck: Normal range of motion, full passive range of motion without pain and phonation normal. Neck supple.   Cardiovascular: Normal rate, regular rhythm, normal heart sounds, intact distal pulses and normal pulses.   Pulmonary/Chest: Effort normal and breath sounds normal. No stridor. No respiratory distress. She has no decreased breath sounds. She has no wheezes. She has no rhonchi. She has no rales.   Abdominal: Soft. Normal appearance and bowel sounds are normal. She exhibits no distension, no pulsatile midline mass and no mass. There is no tenderness.   Musculoskeletal: Normal range of motion. She exhibits no edema or deformity.        Feet:    Neurological: She is alert and oriented to person, place, and time. No cranial nerve deficit. She exhibits normal muscle tone. Coordination normal. GCS eye subscore is 4. GCS verbal subscore is 5. GCS motor subscore is 6.   Skin: Skin is warm, dry, intact, not diaphoretic and not pale. Lesions:  erythema  Psychiatric: She has a normal mood and affect. Her speech is normal and  behavior is normal. Judgment and thought content normal. Cognition and memory are normal.   Nursing note and vitals reviewed.              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  No hardware complication identified.  Additional postsurgical screw is visualized within the calcaneus.  No evidence of acute displaced fracture or dislocation.  Lisfranc articulation appears congruent.    Assessment:       1. Surgical site infection    2. Nonintractable episodic headache, unspecified headache type        Plan:         Surgical site infection  -     X-Ray Foot Complete 3 view Left; Future; Expected date: 10/08/2019    Other orders  -     clindamycin injection 600 mg  -     clindamycin (CLEOCIN) 300 MG capsule; Take 1 capsule (300 mg total) by mouth every 6 (six) hours. for 7 days  Dispense: 28 capsule; Refill: 0  -      butalbital-acetaminophen-caffeine -40 mg (FIORICET, ESGIC) -40 mg per tablet; Take 1 tablet by mouth every 6 (six) hours as needed for Headaches.  Dispense: 10 tablet; Refill: 0    Discussed importance of contacting her surgeon tomorrow.  She states she is going out of town & doesn't want to miss her trip.  I again reiterated that she really needs to see her foot surgeon, as she may need a wound revision.  She verbalized understanding.     Patient Instructions     PLEASE READ YOUR DISCHARGE INSTRUCTIONS ENTIRELY AS IT CONTAINS IMPORTANT INFORMATION.  Do not start the oral antibiotics until tomorrow.  You must call your surgeon & follow up tomorrow.    - Rest.    - Drink plenty of fluids.    - Tylenol or Ibuprofen as directed as needed for fever/pain.    - Follow up with your PCP or specialty clinic as directed in the next 1-2 weeks if not improved or as needed.  You can call [phone] to schedule an appointment with the appropriate provider.    - If you were prescribed antibiotics, please take them to completion.  - If you were  prescribed a narcotic medication, do not drive or operate heavy equipment or machinery while taking these medications.  - If you  smoke, please stop smoking.  -You must understand that you've received an Urgent Care treatment only and that you may be released before all your medical problems are known or treated. You, the patient, will    arrange for follow up care as instructed.  - Please return to Urgent Care or to the Emergency Department if your symptoms worsen.    Patient aware and verbalized understanding.    Recognizing and Treating Wound Infection  Wounds can become infected with harmful germs (bacteria). This prevents healing. It also increases your risk of scars. In some cases, the infection may spread to other parts of your body. And infection with the bacteria that cause tetanus can be fatal. Know what to look for and get prompt treatment for infection.    Risk factors  A wound is more likely to become infected if it:  · Results from a hole (puncture), such as from a nail or piece of glass  · Results from a human or animal bite  · Isn't cleaned or treated within 8 hours  · Occurs in your hand, foot, leg, armpit, or groin (the area where your belly meets your thighs)  · Contains dirt or saliva  · Heals very slowly  · Occurs in a person with diabetes, alcoholism, or a compromised immune system    Symptoms of infection  Call your healthcare provider at the first sign of infection, such as:  · Yellow, yellow-green, or foul-smelling drainage from a wound  · More pain, swelling, or redness in or near a wound  · A change in the color or size of a wound  · Red streaks in the skin around the wound  · Fever  Treatment  Treatment is likely to depend on the type of infection you have, and how serious it is. Your healthcare provider may prescribe oral antibiotics to help fight bacteria. Your provider may also flush the wound with an antibiotic solution or apply an antibiotic ointment. Sometimes a pocket of pus  (abscess) may form. In that case, the abscess will be opened and the fluid drained. You may need hospital care if the infection is very severe.  Preventing wound infection  Follow these steps to help keep wounds from getting infected:  · Wash the wound right away with soap and water.  · Apply a small amount of antibiotic ointment. You can buy this without a prescription.  · Cover wounds with a bandage or gauze dressing. Change daily.  · Keep the wound clean and dry for the first 24 hours.  · Change the dressing daily using sterile gloves.   Date Last Reviewed: 8/13/2015  © 8284-4827 [employer]. [address] [place] [address], [place] [address]. All rights reserved. This information is not intended as a substitute for professional medical care. Always follow your healthcare professional's instructions.

## 2019-10-09 NOTE — PATIENT INSTRUCTIONS
PLEASE READ YOUR DISCHARGE INSTRUCTIONS ENTIRELY AS IT CONTAINS IMPORTANT INFORMATION.  Do not start the oral antibiotics until tomorrow.  You must call your surgeon & follow up tomorrow.    - Rest.    - Drink plenty of fluids.    - Tylenol or Ibuprofen as directed as needed for fever/pain.    - Follow up with your PCP or specialty clinic as directed in the next 1-2 weeks if not improved or as needed.  You can call [phone] to schedule an appointment with the appropriate provider.    - If you were prescribed antibiotics, please take them to completion.  - If you were prescribed a narcotic medication, do not drive or operate heavy equipment or machinery while taking these medications.  - If you  smoke, please stop smoking.  -You must understand that you've received an Urgent Care treatment only and that you may be released before all your medical problems are known or treated. You, the patient, will    arrange for follow up care as instructed.  - Please return to Urgent Care or to the Emergency Department if your symptoms worsen.    Patient aware and verbalized understanding.    Recognizing and Treating Wound Infection  Wounds can become infected with harmful germs (bacteria). This prevents healing. It also increases your risk of scars. In some cases, the infection may spread to other parts of your body. And infection with the bacteria that cause tetanus can be fatal. Know what to look for and get prompt treatment for infection.    Risk factors  A wound is more likely to become infected if it:  · Results from a hole (puncture), such as from a nail or piece of glass  · Results from a human or animal bite  · Isn't cleaned or treated within 8 hours  · Occurs in your hand, foot, leg, armpit, or groin (the area where your belly meets your thighs)  · Contains dirt or saliva  · Heals very slowly  · Occurs in a person with diabetes, alcoholism, or a compromised immune system    Symptoms of infection  Call your  healthcare provider at the first sign of infection, such as:  · Yellow, yellow-green, or foul-smelling drainage from a wound  · More pain, swelling, or redness in or near a wound  · A change in the color or size of a wound  · Red streaks in the skin around the wound  · Fever  Treatment  Treatment is likely to depend on the type of infection you have, and how serious it is. Your healthcare provider may prescribe oral antibiotics to help fight bacteria. Your provider may also flush the wound with an antibiotic solution or apply an antibiotic ointment. Sometimes a pocket of pus (abscess) may form. In that case, the abscess will be opened and the fluid drained. You may need hospital care if the infection is very severe.  Preventing wound infection  Follow these steps to help keep wounds from getting infected:  · Wash the wound right away with soap and water.  · Apply a small amount of antibiotic ointment. You can buy this without a prescription.  · Cover wounds with a bandage or gauze dressing. Change daily.  · Keep the wound clean and dry for the first 24 hours.  · Change the dressing daily using sterile gloves.   Date Last Reviewed: 8/13/2015  © 6050-6161 [employer]. [address] [place] [address], [place] [address]. All rights reserved. This information is not intended as a substitute for professional medical care. Always follow your healthcare professional's instructions.

## 2019-12-30 RX ORDER — ONDANSETRON HYDROCHLORIDE 8 MG/1
8 TABLET, FILM COATED ORAL EVERY 8 HOURS PRN
Qty: 30 TABLET | Refills: 1 | Status: SHIPPED | OUTPATIENT
Start: 2019-12-30

## 2021-05-04 ENCOUNTER — PATIENT MESSAGE (OUTPATIENT)
Dept: RESEARCH | Facility: HOSPITAL | Age: 46
End: 2021-05-04

## 2022-08-17 DIAGNOSIS — R56.9 SEIZURE: Primary | ICD-10-CM

## 2022-08-20 ENCOUNTER — HOSPITAL ENCOUNTER (OUTPATIENT)
Dept: RADIOLOGY | Facility: HOSPITAL | Age: 47
Discharge: HOME | End: 2022-08-20
Payer: MEDICAID

## 2022-08-20 DIAGNOSIS — R56.9 SEIZURE: ICD-10-CM

## 2022-09-06 ENCOUNTER — HOSPITAL ENCOUNTER (OUTPATIENT)
Dept: RADIOLOGY | Facility: HOSPITAL | Age: 47
Discharge: HOME | End: 2022-09-06
Payer: MEDICAID

## 2022-09-06 PROCEDURE — 70551 MRI BRAIN STEM W/O DYE: CPT | Mod: TC,PO

## 2022-12-14 ENCOUNTER — PATIENT MESSAGE (OUTPATIENT)
Dept: ADMINISTRATIVE | Facility: OTHER | Age: 47
End: 2022-12-14
Payer: MEDICAID

## 2022-12-28 ENCOUNTER — HOSPITAL ENCOUNTER (OUTPATIENT)
Dept: RADIOLOGY | Facility: HOSPITAL | Age: 47
Discharge: HOME | End: 2022-12-28
Payer: MEDICAID

## 2022-12-28 DIAGNOSIS — G89.29 OTHER CHRONIC PAIN: ICD-10-CM

## 2022-12-28 DIAGNOSIS — G62.9 NEUROPATHY: ICD-10-CM

## 2022-12-28 DIAGNOSIS — M25.512 PAIN RADIATING TO LEFT SHOULDER: ICD-10-CM

## 2022-12-28 DIAGNOSIS — M54.2 CERVICALGIA: ICD-10-CM

## 2022-12-28 DIAGNOSIS — M54.12 RADICULOPATHY, CERVICAL REGION: ICD-10-CM

## 2022-12-28 PROCEDURE — 72141 MRI NECK SPINE W/O DYE: CPT | Mod: TC,PO

## 2023-03-22 ENCOUNTER — OFFICE VISIT (OUTPATIENT)
Dept: UROLOGY | Facility: CLINIC | Age: 48
End: 2023-03-22
Payer: MEDICAID

## 2023-03-22 ENCOUNTER — TELEPHONE (OUTPATIENT)
Dept: GASTROENTEROLOGY | Facility: CLINIC | Age: 48
End: 2023-03-22
Payer: MEDICAID

## 2023-03-22 VITALS
WEIGHT: 173.81 LBS | SYSTOLIC BLOOD PRESSURE: 122 MMHG | HEIGHT: 66 IN | BODY MASS INDEX: 27.93 KG/M2 | DIASTOLIC BLOOD PRESSURE: 80 MMHG | HEART RATE: 75 BPM

## 2023-03-22 DIAGNOSIS — N39.3 SUI (STRESS URINARY INCONTINENCE, FEMALE): Primary | ICD-10-CM

## 2023-03-22 DIAGNOSIS — R35.1 NOCTURIA: ICD-10-CM

## 2023-03-22 DIAGNOSIS — Z01.818 PRE-OP TESTING: ICD-10-CM

## 2023-03-22 DIAGNOSIS — R35.0 URINARY FREQUENCY: ICD-10-CM

## 2023-03-22 LAB
BILIRUB UR QL STRIP: NEGATIVE
CLARITY UR REFRACT.AUTO: CLEAR
COLOR UR AUTO: COLORLESS
GLUCOSE UR QL STRIP: NEGATIVE
HGB UR QL STRIP: NEGATIVE
KETONES UR QL STRIP: NEGATIVE
LEUKOCYTE ESTERASE UR QL STRIP: NEGATIVE
NITRITE UR QL STRIP: NEGATIVE
PH UR STRIP: 7 [PH] (ref 5–8)
PROT UR QL STRIP: NEGATIVE
SP GR UR STRIP: 1.01 (ref 1–1.03)
URN SPEC COLLECT METH UR: ABNORMAL

## 2023-03-22 PROCEDURE — 1159F PR MEDICATION LIST DOCUMENTED IN MEDICAL RECORD: ICD-10-PCS | Mod: CPTII,,, | Performed by: NURSE PRACTITIONER

## 2023-03-22 PROCEDURE — 3008F BODY MASS INDEX DOCD: CPT | Mod: CPTII,,, | Performed by: NURSE PRACTITIONER

## 2023-03-22 PROCEDURE — 81003 URINALYSIS AUTO W/O SCOPE: CPT | Performed by: NURSE PRACTITIONER

## 2023-03-22 PROCEDURE — 87086 URINE CULTURE/COLONY COUNT: CPT | Performed by: NURSE PRACTITIONER

## 2023-03-22 PROCEDURE — 87088 URINE BACTERIA CULTURE: CPT | Performed by: NURSE PRACTITIONER

## 2023-03-22 PROCEDURE — 99204 PR OFFICE/OUTPT VISIT, NEW, LEVL IV, 45-59 MIN: ICD-10-PCS | Mod: S$PBB,,, | Performed by: NURSE PRACTITIONER

## 2023-03-22 PROCEDURE — 99215 OFFICE O/P EST HI 40 MIN: CPT | Mod: PBBFAC,PO | Performed by: NURSE PRACTITIONER

## 2023-03-22 PROCEDURE — 1160F PR REVIEW ALL MEDS BY PRESCRIBER/CLIN PHARMACIST DOCUMENTED: ICD-10-PCS | Mod: CPTII,,, | Performed by: NURSE PRACTITIONER

## 2023-03-22 PROCEDURE — 3074F SYST BP LT 130 MM HG: CPT | Mod: CPTII,,, | Performed by: NURSE PRACTITIONER

## 2023-03-22 PROCEDURE — 99999 PR PBB SHADOW E&M-EST. PATIENT-LVL V: ICD-10-PCS | Mod: PBBFAC,,, | Performed by: NURSE PRACTITIONER

## 2023-03-22 PROCEDURE — 1159F MED LIST DOCD IN RCRD: CPT | Mod: CPTII,,, | Performed by: NURSE PRACTITIONER

## 2023-03-22 PROCEDURE — 99204 OFFICE O/P NEW MOD 45 MIN: CPT | Mod: S$PBB,,, | Performed by: NURSE PRACTITIONER

## 2023-03-22 PROCEDURE — 1160F RVW MEDS BY RX/DR IN RCRD: CPT | Mod: CPTII,,, | Performed by: NURSE PRACTITIONER

## 2023-03-22 PROCEDURE — 99999 PR PBB SHADOW E&M-EST. PATIENT-LVL V: CPT | Mod: PBBFAC,,, | Performed by: NURSE PRACTITIONER

## 2023-03-22 PROCEDURE — 3008F PR BODY MASS INDEX (BMI) DOCUMENTED: ICD-10-PCS | Mod: CPTII,,, | Performed by: NURSE PRACTITIONER

## 2023-03-22 PROCEDURE — 87186 SC STD MICRODIL/AGAR DIL: CPT | Performed by: NURSE PRACTITIONER

## 2023-03-22 PROCEDURE — 3079F DIAST BP 80-89 MM HG: CPT | Mod: CPTII,,, | Performed by: NURSE PRACTITIONER

## 2023-03-22 PROCEDURE — 3079F PR MOST RECENT DIASTOLIC BLOOD PRESSURE 80-89 MM HG: ICD-10-PCS | Mod: CPTII,,, | Performed by: NURSE PRACTITIONER

## 2023-03-22 PROCEDURE — 3074F PR MOST RECENT SYSTOLIC BLOOD PRESSURE < 130 MM HG: ICD-10-PCS | Mod: CPTII,,, | Performed by: NURSE PRACTITIONER

## 2023-03-22 PROCEDURE — 87077 CULTURE AEROBIC IDENTIFY: CPT | Performed by: NURSE PRACTITIONER

## 2023-03-22 NOTE — PROGRESS NOTES
Subjective:       Patient ID: Trice Fink is a 47 y.o. female.    Chief Complaint: bladder leakage    Patient is new to me. She is a 46 yo WF who is here today for evaluation of urinary incontinence with laughing, coughing, sneezing, position changes during sex, and jumping. She wears a pad when she goes out for extended periods of time. Patient reports a hx of kidney stones. Her surgical hx includes a hysterectomy and . Patient is here today with her daughter.    Other  This is a chronic (urinary incontinence) problem. Episode onset: a year and a half. The problem has been unchanged. Associated symptoms include fatigue, nausea and urinary symptoms. Pertinent negatives include no abdominal pain, anorexia, arthralgias, change in bowel habit, chills, fever, headaches, swollen glands, vomiting or weakness. Nothing aggravates the symptoms. Treatments tried: kegel. The treatment provided mild relief.   Review of Systems   Constitutional:  Positive for fatigue. Negative for chills and fever.   Gastrointestinal:  Positive for nausea. Negative for abdominal pain, anorexia, change in bowel habit, constipation, diarrhea, vomiting and change in bowel habit.   Genitourinary:  Positive for bladder incontinence (with laughing, coughing, sneezing, and position changes), frequency and nocturia (x0-1). Negative for decreased urine volume, difficulty urinating, dysuria, flank pain, hematuria, pelvic pain, urgency, vaginal bleeding, vaginal discharge and vaginal pain.   Musculoskeletal:  Negative for arthralgias.   Neurological:  Negative for dizziness, weakness and headaches.   Psychiatric/Behavioral: Negative.         Objective:      Physical Exam  Vitals and nursing note reviewed.   Constitutional:       General: She is not in acute distress.     Appearance: She is well-developed. She is not ill-appearing.   HENT:      Head: Normocephalic and atraumatic.   Eyes:      Pupils: Pupils are equal, round, and reactive to  light.   Cardiovascular:      Rate and Rhythm: Normal rate.   Pulmonary:      Effort: Pulmonary effort is normal. No respiratory distress.   Abdominal:      Palpations: Abdomen is soft.      Tenderness: There is no abdominal tenderness.   Musculoskeletal:         General: Normal range of motion.      Cervical back: Normal range of motion.   Skin:     General: Skin is warm and dry.   Neurological:      Mental Status: She is alert and oriented to person, place, and time.      Coordination: Coordination normal.   Psychiatric:         Mood and Affect: Mood normal.         Behavior: Behavior normal.         Thought Content: Thought content normal.         Judgment: Judgment normal.       Assessment:       Problem List Items Addressed This Visit    None  Visit Diagnoses       JEN (stress urinary incontinence, female)    -  Primary    Relevant Orders    Urine culture    Urinalysis    Basic Metabolic Panel    CBC Auto Differential    Urinary frequency        Relevant Orders    Urine culture    Urinalysis    Basic Metabolic Panel    CBC Auto Differential    Nocturia        Relevant Orders    Urine culture    Urinalysis    Basic Metabolic Panel    CBC Auto Differential    Pre-op testing        Relevant Orders    Urine culture    Urinalysis    Basic Metabolic Panel    CBC Auto Differential              Plan:           Trice was seen today for bladder leakage.    Diagnoses and all orders for this visit:    JEN (stress urinary incontinence, female)  -     Urine culture  -     Urinalysis  -     Basic Metabolic Panel; Future  -     CBC Auto Differential; Future    Urinary frequency  -     Urine culture  -     Urinalysis  -     Basic Metabolic Panel; Future  -     CBC Auto Differential; Future    Nocturia  -     Urine culture  -     Urinalysis  -     Basic Metabolic Panel; Future  -     CBC Auto Differential; Future    Pre-op testing  -     Urine culture  -     Urinalysis  -     Basic Metabolic Panel; Future  -     CBC Auto  Differential; Future    Other orders  Schedule patient for cystoscopy with UDS by Dr. Tavares for evaluation urinary stress incontinence.   Pre-op labs needed (CBC, BMP, urine cx, and U/A).  Surgery packet provided to patient.     Follow-up post-op.     Lisbeth Chavez NP

## 2023-03-22 NOTE — LETTER
March 22, 2023    [facility]  [address] [place] [address]             [facility]  [address], [place]   [facility] [address]  Phone: [phone]  Fax: [phone] Dear Ms. Fink:    We have attempted to contact you to schedule a screening colonoscopy that was ordered by your doctor. Please contact the office to schedule at [phone].       If you have any questions or concerns, please don't hesitate to call.    Sincerely,        Kate Boss MD

## 2023-03-22 NOTE — PATIENT INSTRUCTIONS
Schedule patient for cystoscopy with UDS by Dr. Tavares for evaluation urinary stress incontinence.   Pre-op labs needed (CBC, BMP, urine cx, and U/A).  Surgery packet provided to patient.   Follow-up post-op.

## 2023-03-23 ENCOUNTER — TELEPHONE (OUTPATIENT)
Dept: GASTROENTEROLOGY | Facility: CLINIC | Age: 48
End: 2023-03-23
Payer: MEDICAID

## 2023-03-23 ENCOUNTER — PATIENT MESSAGE (OUTPATIENT)
Dept: GASTROENTEROLOGY | Facility: CLINIC | Age: 48
End: 2023-03-23
Payer: MEDICAID

## 2023-03-23 NOTE — TELEPHONE ENCOUNTER
----- Message from Carlotta Ordaz sent at 3/23/2023  3:09 PM CDT -----  Type:  Patient Returning Call    Who Called:pt  Who Left Message for Patient:Angie Woodruff  Does the patient know what this is regarding?:colonoscopy.   Would the patient rather a call back or a response via [facility]? call  Best Call Back Number:[phone]  Additional Information:

## 2023-03-23 NOTE — TELEPHONE ENCOUNTER
Referring Physician: Paris Rivera NP                             Date: 3/23/2023    Reason for Referral: Personal history of colon polyps      Family History of:   Colon polyp: Yes  Relationship/Age of Onset: Mother and maternal grandmother      Colon cancer: No  Relationship/Age of Onset:       Patient with:   Hemoccults Done:       Iron deficient:   No      On Blood Thinner: no      Valvular heart disease/valve replacement: no      Anemia Present: No      On NSAID: No    On Adipex or phentermine: No     On Ozempic: No     Lung disease: No      Kidney disease: No     Hx of Crohn's or Ulcerative colitis: No     Hx of polyps:       Hx of colon cancer:       Previous colon evalations: First colonoscopy  When:   Where:   Pertinent symptoms:           Review of patient's allergies indicates: NKDA        Patient was scheduled for colonoscopy on 3/30/2023       with Dr. Boss at [facility].       instructions were reviewed with patient.        Prep sent to [facility] in [place]      SUPREP Instructions    You are scheduled for a colonoscopy with Dr. Boss on 3/30/2023 at [facility]. Enter through the [facility] Entrance and check in at Same Day Surgery.  To ensure that your test is accurate and complete, you MUST follow these instructions listed below.  If you have any questions, please call our office at [phone].  Plan on being at the hospital for your procedure for 3-4 hours.      IF YOU HAVE ANY QUESTIONS ABOUT YOUR ARRIVAL TIME YOU CAN CALL [phone].    1.  Follow a CLEAR LIQUID DIET for the entire day before your scheduled colonoscopy.  This means no solid food the entire day starting when you wake.  You may have as much of the clear liquids as you want throughout the day.   CLEAR LIQUID DIET:      - NO DAIRY   - You can have:  Coffee with sugar (no creamer), tea, water, soda, apple or white grape juice, chicken or beef broth/bouillon (no meat, noodles, or veggies),  popsicles,  , lemonade.    2.  AT 5 pm the evening before your colonoscopy, POUR ONE (1) BOTTLE OF SUPREP INTO THE MIXING CONTAINER, PROVIDED INSIDE THE BOX.  ADD WATER TO THE LINE ON THE CONTAINER AND MIX IT WELL.  DRINK THE ENTIRE CONTAINER AND THEN DRINK TWO (2) MORE CONTAINERS OF WATER OVER THE NEXT 1 HOUR.  This is sometimes easier to drink if this solution is cold, so you can mix the solution 20 minutes ahead of time and place in the refrigerator prior to drinking.  You have to drink the solution within 30-45 minutes of mixing it.  Do NOT put this solution over ice.  It IS ok to drink with a straw.    3.  The endoscopy department will call you 1 day before your colonoscopy to tell you the exact time to arrive, AND to tell you the exact time to drink the 2nd portion of your prep (which will be FIVE HOURS BEFORE YOUR ARRIVAL TIME).  At this time given to you, POUR ONE (1) BOTTLE OF SUPREP INTO THE MIXING CONTAINER, PROVIDED INSIDE THE BOX.  ADD WATER TO THE LINE ON THE CONTAINER AND MIX IT WELL.  DRINK THE ENTIRE CONTAINER AND THEN DRINK TWO (2) MORE CONTAINERS OF WATER OVER THE NEXT 1 HOUR.  This is sometimes easier to drink if this solution is cold, so you can mix the solution 20 minutes ahead of time and place in the refrigerator prior to drinking.  You have to drink the solution within 30-45 minutes of mixing it.  Do NOT put this solution over ice.  It IS ok to drink with a straw.  Once this is complete, you may not have ANYTHING else by mouth!    4.  You must have someone with you to DRIVE YOU HOME since you will be receiving IV sedation for the colonoscopy.    5.  It is ok to take MOST of your REGULAR MEDICATIONS  in the morning of your test with a SIP of water.  THE ONLY MEDS YOU NEED TO HOLD ARE YOUR DIABETES MEDICATIONS,  SOME BLOOD PRESSURE MEDS, AND BLOOD THINNERS IF OK'D BY YOUR DOCTOR.  Do NOT have anything else to eat or drink the morning of your colonoscopy.  It is ok to brush your teeth.    6.  If you are on  blood thinners THAT YOU HAVE BEEN INSTRUCTED TO HOLD BY YOUR DOCTOR FOR THIS PROCEDURE, then do NOT take this the morning of your colonoscopy.  Do NOT stop these medications on your own, they must be approved to be held by your doctor.  Your colonoscopy can NOT be done if you are on these medications.  Examples of blood thinners include: Coumadin, Aggrenox, Plavix, Pradaxa, Reapro, Pletal, Xarelto, Ticagrelor, Brilinta, Eliquis, and high dose aspirin (325 mg).  You do not have to stop baby aspirin 81 mg.    7.  IF YOU ARE DIABETIC:  NO INSULIN OR ORAL MEDICATIONS THE MORNING OF THE COLONOSCOPY.  TAKE ONLY HALF THE DOSE OF YOUR INSULIN THE DAY BEFORE THE COLONOSCOPY.  DO NOT TAKE ANY ORAL DIABETIC MEDICATIONS THE DAY BEFORE THE COLONOSCOPY.  IF YOU ARE AN INSULIN DEPENDENT DIABETIC WITH UNSTABLE BLOOD SUGARS, NOTIFY YOUR PRIMARY CARE PHYSICIAN FOR INSTRUCTIONS.

## 2023-03-24 LAB — BACTERIA UR CULT: ABNORMAL

## 2023-03-24 RX ORDER — SODIUM, POTASSIUM,MAG SULFATES 17.5-3.13G
1 SOLUTION, RECONSTITUTED, ORAL ORAL DAILY
Qty: 1 KIT | Refills: 0 | Status: SHIPPED | OUTPATIENT
Start: 2023-03-24 | End: 2023-03-26

## 2023-03-27 ENCOUNTER — TELEPHONE (OUTPATIENT)
Dept: UROLOGY | Facility: CLINIC | Age: 48
End: 2023-03-27
Payer: MEDICAID

## 2023-03-27 DIAGNOSIS — N30.00 ACUTE CYSTITIS WITHOUT HEMATURIA: Primary | ICD-10-CM

## 2023-03-27 RX ORDER — AMOXICILLIN AND CLAVULANATE POTASSIUM 500; 125 MG/1; MG/1
1 TABLET, FILM COATED ORAL 3 TIMES DAILY
Qty: 30 TABLET | Refills: 0 | Status: SHIPPED | OUTPATIENT
Start: 2023-03-27 | End: 2023-04-06

## 2023-03-27 NOTE — TELEPHONE ENCOUNTER
----- Message from Lisbeth Chavez NP sent at 3/27/2023  4:32 PM CDT -----  Please inform patient via telephone that her urine cx came back positive for a UTI. Script for Augmentin sent to [facility]. Repeat urine cx after completion of antibiotic therapy (in 2 weeks). Order placed.

## 2023-03-27 NOTE — PROGRESS NOTES
Diagnoses and all orders for this visit:    Acute cystitis without hematuria  -     amoxicillin-clavulanate 500-125mg (AUGMENTIN) 500-125 mg Tab; Take 1 tablet (500 mg total) by mouth 3 (three) times daily. for 10 days  -     Urine culture; Future    Repeat urine cx after completion of antibiotic therapy (in 2 weeks).     Lisbeth Chavez NP

## 2023-03-29 DIAGNOSIS — R35.0 URINARY FREQUENCY: ICD-10-CM

## 2023-03-29 DIAGNOSIS — R35.1 NOCTURIA: ICD-10-CM

## 2023-03-29 DIAGNOSIS — N39.3 SUI (STRESS URINARY INCONTINENCE, FEMALE): Primary | ICD-10-CM

## 2023-03-29 RX ORDER — SODIUM CHLORIDE 9 MG/ML
INJECTION, SOLUTION INTRAVENOUS CONTINUOUS
Status: CANCELLED | OUTPATIENT
Start: 2023-03-29

## 2023-03-29 RX ORDER — CIPROFLOXACIN 2 MG/ML
400 INJECTION, SOLUTION INTRAVENOUS
Status: CANCELLED | OUTPATIENT
Start: 2023-03-29

## 2023-03-29 RX ORDER — LIDOCAINE HYDROCHLORIDE 20 MG/ML
JELLY TOPICAL ONCE
Status: CANCELLED | OUTPATIENT
Start: 2023-03-29 | End: 2023-03-29

## 2023-03-30 PROBLEM — Z83.719 FAMILY HISTORY OF COLONIC POLYPS: Status: ACTIVE | Noted: 2023-03-30

## 2023-03-30 PROBLEM — Z80.0 FAMILY HISTORY OF COLON CANCER: Status: ACTIVE | Noted: 2023-03-30

## 2023-03-31 DIAGNOSIS — K62.1 RECTAL POLYP: Primary | ICD-10-CM

## 2023-04-17 PROBLEM — N39.3 SUI (STRESS URINARY INCONTINENCE, FEMALE): Status: ACTIVE | Noted: 2023-04-17

## 2023-04-17 PROBLEM — R35.0 URINARY FREQUENCY: Status: ACTIVE | Noted: 2023-04-17

## 2023-04-17 PROBLEM — R35.1 NOCTURIA: Status: ACTIVE | Noted: 2023-04-17

## 2023-06-01 ENCOUNTER — OFFICE VISIT (OUTPATIENT)
Dept: SURGERY | Facility: CLINIC | Age: 48
End: 2023-06-01
Payer: MEDICAID

## 2023-06-01 VITALS
WEIGHT: 172.31 LBS | HEIGHT: 63 IN | DIASTOLIC BLOOD PRESSURE: 76 MMHG | HEART RATE: 78 BPM | SYSTOLIC BLOOD PRESSURE: 108 MMHG | BODY MASS INDEX: 30.53 KG/M2

## 2023-06-01 DIAGNOSIS — K62.1 RECTAL POLYP: Primary | ICD-10-CM

## 2023-06-01 PROCEDURE — 1159F MED LIST DOCD IN RCRD: CPT | Mod: CPTII,,, | Performed by: COLON & RECTAL SURGERY

## 2023-06-01 PROCEDURE — 1159F PR MEDICATION LIST DOCUMENTED IN MEDICAL RECORD: ICD-10-PCS | Mod: CPTII,,, | Performed by: COLON & RECTAL SURGERY

## 2023-06-01 PROCEDURE — 99215 OFFICE O/P EST HI 40 MIN: CPT | Mod: PBBFAC,PN | Performed by: COLON & RECTAL SURGERY

## 2023-06-01 PROCEDURE — 99203 PR OFFICE/OUTPT VISIT, NEW, LEVL III, 30-44 MIN: ICD-10-PCS | Mod: S$PBB,25,, | Performed by: COLON & RECTAL SURGERY

## 2023-06-01 PROCEDURE — 3078F PR MOST RECENT DIASTOLIC BLOOD PRESSURE < 80 MM HG: ICD-10-PCS | Mod: CPTII,,, | Performed by: COLON & RECTAL SURGERY

## 2023-06-01 PROCEDURE — 46600 DIAGNOSTIC ANOSCOPY SPX: CPT | Mod: PBBFAC,PN | Performed by: COLON & RECTAL SURGERY

## 2023-06-01 PROCEDURE — 99203 OFFICE O/P NEW LOW 30 MIN: CPT | Mod: S$PBB,25,, | Performed by: COLON & RECTAL SURGERY

## 2023-06-01 PROCEDURE — 4010F PR ACE/ARB THEARPY RXD/TAKEN: ICD-10-PCS | Mod: CPTII,,, | Performed by: COLON & RECTAL SURGERY

## 2023-06-01 PROCEDURE — 4010F ACE/ARB THERAPY RXD/TAKEN: CPT | Mod: CPTII,,, | Performed by: COLON & RECTAL SURGERY

## 2023-06-01 PROCEDURE — 3074F PR MOST RECENT SYSTOLIC BLOOD PRESSURE < 130 MM HG: ICD-10-PCS | Mod: CPTII,,, | Performed by: COLON & RECTAL SURGERY

## 2023-06-01 PROCEDURE — 3078F DIAST BP <80 MM HG: CPT | Mod: CPTII,,, | Performed by: COLON & RECTAL SURGERY

## 2023-06-01 PROCEDURE — 99999 PR PBB SHADOW E&M-EST. PATIENT-LVL V: CPT | Mod: PBBFAC,,, | Performed by: COLON & RECTAL SURGERY

## 2023-06-01 PROCEDURE — 1160F RVW MEDS BY RX/DR IN RCRD: CPT | Mod: CPTII,,, | Performed by: COLON & RECTAL SURGERY

## 2023-06-01 PROCEDURE — 3008F BODY MASS INDEX DOCD: CPT | Mod: CPTII,,, | Performed by: COLON & RECTAL SURGERY

## 2023-06-01 PROCEDURE — 99999 PR PBB SHADOW E&M-EST. PATIENT-LVL V: ICD-10-PCS | Mod: PBBFAC,,, | Performed by: COLON & RECTAL SURGERY

## 2023-06-01 PROCEDURE — 46600 DIAGNOSTIC ANOSCOPY SPX: CPT | Mod: S$PBB,,, | Performed by: COLON & RECTAL SURGERY

## 2023-06-01 PROCEDURE — 46600 PR DIAG2STIC A2SCOPY: ICD-10-PCS | Mod: S$PBB,,, | Performed by: COLON & RECTAL SURGERY

## 2023-06-01 PROCEDURE — 3074F SYST BP LT 130 MM HG: CPT | Mod: CPTII,,, | Performed by: COLON & RECTAL SURGERY

## 2023-06-01 PROCEDURE — 1160F PR REVIEW ALL MEDS BY PRESCRIBER/CLIN PHARMACIST DOCUMENTED: ICD-10-PCS | Mod: CPTII,,, | Performed by: COLON & RECTAL SURGERY

## 2023-06-01 PROCEDURE — 3008F PR BODY MASS INDEX (BMI) DOCUMENTED: ICD-10-PCS | Mod: CPTII,,, | Performed by: COLON & RECTAL SURGERY

## 2023-06-01 NOTE — PROGRESS NOTES
CRS Office Visit History and Physical    Referring Md:   Kate Boss Md  [address]  [address]   [place] [address]    SUBJECTIVE:     Chief Complaint: rectal polyp    History of Present Illness:  The patient is a new patient to this practice.   Course is as follows:  Patient is a 47 y.o. female presents with rectal polyp  3/30/23: colonoscopy done for high risk screening due to extensive family history of polyps.     Findings: A 20 mm polypoid lesion was found in the distal rectum. The lesion was polypoid and pedunculated. Biopsies were taken with a cold forceps for histology.  Abutted the dentate line with indiscrete margins.     - biopsies with tubular adenoma.   Functionally, she is 1 bowel movement per day.  No issues with fecal incontinence.  Occasional bleeding.  No prolapse of tissue.  Quit smoking in .  Family history significant for colon cancer in her maternal great grandmother.  No past anorectal surgeries.    Last Colonoscopy: 3/30/23:  Impression:            - Redundant colon.                          - Severe diverticulosis in the sigmoid colon.                          There was narrowing of the colon in association                          with the diverticular opening.                          - Polypoid lesion in the distal rectum. Biopsied.                          This goes up to the dentate line and does not have                          discrete borders on any side.                          - Internal hemorrhoids.     Review of patient's allergies indicates:  No Known Allergies    Past Medical History:   Diagnosis Date    Anxiety     Bipolar 2 disorder     Family history of colon cancer 2023    Family history of colonic polyps 2023    Pre-eclampsia      Past Surgical History:   Procedure Laterality Date    APPENDECTOMY       SECTION      COLONOSCOPY N/A 3/30/2023    Procedure: COLONOSCOPY;  Surgeon: Kate Boss MD;  Location: [facility];   Service: Endoscopy;  Laterality: N/A;    CYSTOSCOPY WITH CYSTOMETROGRAPHY N/A 4/17/2023    Procedure: CYSTOSCOPY, WITH CYSTOMETROGRAM;  Surgeon: Kiel Tavares MD;  Location: [facility] OR;  Service: Urology;  Laterality: N/A;    CYSTOSCOPY WITH URODYNAMIC TESTING N/A 4/17/2023    Procedure: CYSTOSCOPY, WITH URODYNAMIC TESTING;  Surgeon: Kiel Tavares MD;  Location: [facility] OR;  Service: Urology;  Laterality: N/A;    CYSTOURETHROGRAPHY N/A 4/17/2023    Procedure: CYSTOURETHROGRAM;  Surgeon: Kiel Tavares MD;  Location: [facility] OR;  Service: Urology;  Laterality: N/A;    ELECTROMYOGRAPHY N/A 4/17/2023    Procedure: EMG (ELECTROMYOGRAPHY);  Surgeon: Kiel Tavares MD;  Location: [facility] OR;  Service: Urology;  Laterality: N/A;    FOOT SURGERY Left     heel relocation      HYSTERECTOMY      OVARIAN CYST REMOVAL      PARTIAL HYSTERECTOMY      UROFLOWMETRY  4/17/2023    Procedure: UROFLOWMETRY;  Surgeon: Kiel Tavares MD;  Location: [facility] OR;  Service: Urology;;     Family History   Problem Relation Age of Onset    Hypertension Mother     Hypertension Maternal Grandmother     Cancer Father      Social History     Tobacco Use    Smoking status: Every Day     Types: Vaping with nicotine   Substance Use Topics    Alcohol use: Yes     Comment: occasionally    Drug use: No        Review of Systems:  Review of Systems   Constitutional:  Negative for chills, diaphoresis, fever, malaise/fatigue and weight loss.   HENT:  Negative for congestion.    Respiratory:  Negative for shortness of breath.    Cardiovascular:  Negative for chest pain and leg swelling.   Gastrointestinal:  Negative for abdominal pain, blood in stool, constipation, nausea and vomiting.   Genitourinary:  Negative for dysuria.   Musculoskeletal:  Negative for back pain and myalgias.   Skin:  Negative for rash.   Neurological:  Negative for dizziness and weakness.   Endo/Heme/Allergies:  Does not bruise/bleed easily.   Psychiatric/Behavioral:  Negative for  "depression.      OBJECTIVE:     Vital Signs (Most Recent)  /76 (BP Location: Left arm, Patient Position: Sitting, BP Method: Small (Automatic))   Pulse 78   Ht 5' 3" (1.6 m)   Wt 78.1 kg (172 lb 4.6 oz)   LMP  (LMP Unknown)   BMI 30.52 kg/m²     Physical Exam:  General: White female in no distress   Neuro: alert and oriented x 4.  Moves all extremities.     HEENT: no icterus.  Trachea midline  Respiratory: respirations are even and unlabored  Cardiac: regular rate  Abdomen:  Soft, nontender, no masses  Extremities: Warm dry and intact  Skin: no rashes  Anorectal:  External exam was normal.  Digital exam performed.  Soft polypoid lesion palpated posterior midline abutting the dentate line.  Appears free from the underlying muscle.  Detailed anoscopy performed.  2 cm polypoid lesions seen posterior midline abutting the dentate line.    Labs: H&H 14 and 40.  Normal renal function    Imaging: none      ASSESSMENT/PLAN:     Diagnoses and all orders for this visit:    Rectal polyp  -     Ambulatory referral/consult to Colorectal Surgery  -     Case Request Operating Room: EXCISION-TRANSANAL, prone        47 y.o. female with distal rectal adenomatous polyp abutting the dentate line.   Lesions posteriorly.  Recommended transanal excision.  This appears amenable to transanal excision using the Hill-Garcia anoscope as well as the Lone star.  Would position prone to assist in visualization.  Consents signed today and all questions answered.  She is aware of the risk of bleeding, recurrence, pain.  Planned for late July at [facility].    KOKO Zavaleta MD, FACS, FASCRS  Staff Surgeon  Colon & Rectal Surgery      "

## 2023-08-02 RX ORDER — OXYCODONE HYDROCHLORIDE 5 MG/1
5 TABLET ORAL
Qty: 15 TABLET | Refills: 0 | Status: CANCELLED | OUTPATIENT
Start: 2023-08-02

## 2023-08-02 RX ORDER — OXYCODONE HYDROCHLORIDE 5 MG/1
5 TABLET ORAL EVERY 8 HOURS PRN
Qty: 15 TABLET | Refills: 0 | Status: SHIPPED | OUTPATIENT
Start: 2023-08-02 | End: 2023-12-22

## 2023-08-02 NOTE — TELEPHONE ENCOUNTER
----- Message from Yara Reyes sent at 8/2/2023  2:56 PM CDT -----  Type:  RX Refill Request    Who Called:     oxyCODONE (ROXICODONE) 5 MG immediate release tablet 15 tablet 0 7/27/2023  No  Sig - Route: Take 1 tablet (5 mg total) by mouth every 4 to 6 hours as needed for Pain. - Oral  Sent to pharmacy as: oxyCODONE (ROXICODONE) 5 MG immediate release tablet  Class: Normal  Earliest Fill Date: 7/27/2023  Notes to Pharmacy: Quantity prescribed more than 7 day supply? Yes, quantity medically necessary  Order: [id number]  Ordered from Order Set: IP CRS ADULT OUTPATIENT POST-OPERATIVE  Date/Time Signed: 7/27/2023 10:25      E-Prescribing Status: Receipt confirmed by pharmacy (7/27/2023 10:25 AM CDT)    Pharmacy  [facility]  [place] - [address]              Would the patient rather a call back or a response via [employer] [email or web address]?   Best Call Back Number:  Additional Information:

## 2023-08-18 ENCOUNTER — NURSE TRIAGE (OUTPATIENT)
Dept: ADMINISTRATIVE | Facility: CLINIC | Age: 48
End: 2023-08-18
Payer: MEDICAID

## 2023-08-18 NOTE — TELEPHONE ENCOUNTER
"[place]    PCP:  FARIHA Lucio    C/O rectal bleeding (reports medium red in color) with blood clots and dripping from her rectum.  Denies bright-red blood, abdominal pain, rectal pain, vomiting, dizziness, taking blood thinners, constipation, and fever.  S/P Transanal excision on 7/27 with Dr. Zavaleta.  She does report stool was a little harder today.  Per protocol, care advised is go to the ED now.  Pt VU.  Advised to call for worsening/questions/concerns.  VU.     Reason for Disposition   SEVERE rectal bleeding (large blood clots; constant or on and off bleeding)    Additional Information   Negative: Shock suspected (e.g., cold/pale/clammy skin, too weak to stand, low BP, rapid pulse)   Negative: Difficult to awaken or acting confused (e.g., disoriented, slurred speech)   Negative: Passed out (i.e., lost consciousness, collapsed and was not responding)   Negative: [1] Vomiting AND [2] contains red blood or black ("coffee ground") material  (Exception: Few red streaks in vomit that only happened once.)   Negative: Sounds like a life-threatening emergency to the triager    Protocols used: Rectal Bleeding-A-AH    " Addended by: ASNJAY COLIN on: 6/28/2023 02:30 PM     Modules accepted: Orders

## 2023-08-24 ENCOUNTER — OFFICE VISIT (OUTPATIENT)
Dept: SURGERY | Facility: CLINIC | Age: 48
End: 2023-08-24
Payer: MEDICAID

## 2023-08-24 VITALS
DIASTOLIC BLOOD PRESSURE: 75 MMHG | BODY MASS INDEX: 31.46 KG/M2 | SYSTOLIC BLOOD PRESSURE: 98 MMHG | HEIGHT: 63 IN | WEIGHT: 177.56 LBS

## 2023-08-24 DIAGNOSIS — K62.1 RECTAL POLYP: Primary | ICD-10-CM

## 2023-08-24 PROCEDURE — 3008F BODY MASS INDEX DOCD: CPT | Mod: CPTII,,, | Performed by: COLON & RECTAL SURGERY

## 2023-08-24 PROCEDURE — 1159F PR MEDICATION LIST DOCUMENTED IN MEDICAL RECORD: ICD-10-PCS | Mod: CPTII,,, | Performed by: COLON & RECTAL SURGERY

## 2023-08-24 PROCEDURE — 4010F ACE/ARB THERAPY RXD/TAKEN: CPT | Mod: CPTII,,, | Performed by: COLON & RECTAL SURGERY

## 2023-08-24 PROCEDURE — 99213 OFFICE O/P EST LOW 20 MIN: CPT | Mod: PBBFAC,PN | Performed by: COLON & RECTAL SURGERY

## 2023-08-24 PROCEDURE — 3008F PR BODY MASS INDEX (BMI) DOCUMENTED: ICD-10-PCS | Mod: CPTII,,, | Performed by: COLON & RECTAL SURGERY

## 2023-08-24 PROCEDURE — 1160F PR REVIEW ALL MEDS BY PRESCRIBER/CLIN PHARMACIST DOCUMENTED: ICD-10-PCS | Mod: CPTII,,, | Performed by: COLON & RECTAL SURGERY

## 2023-08-24 PROCEDURE — 99999 PR PBB SHADOW E&M-EST. PATIENT-LVL III: ICD-10-PCS | Mod: PBBFAC,,, | Performed by: COLON & RECTAL SURGERY

## 2023-08-24 PROCEDURE — 3074F PR MOST RECENT SYSTOLIC BLOOD PRESSURE < 130 MM HG: ICD-10-PCS | Mod: CPTII,,, | Performed by: COLON & RECTAL SURGERY

## 2023-08-24 PROCEDURE — 99024 PR POST-OP FOLLOW-UP VISIT: ICD-10-PCS | Mod: ,,, | Performed by: COLON & RECTAL SURGERY

## 2023-08-24 PROCEDURE — 99999 PR PBB SHADOW E&M-EST. PATIENT-LVL III: CPT | Mod: PBBFAC,,, | Performed by: COLON & RECTAL SURGERY

## 2023-08-24 PROCEDURE — 99024 POSTOP FOLLOW-UP VISIT: CPT | Mod: ,,, | Performed by: COLON & RECTAL SURGERY

## 2023-08-24 PROCEDURE — 3074F SYST BP LT 130 MM HG: CPT | Mod: CPTII,,, | Performed by: COLON & RECTAL SURGERY

## 2023-08-24 PROCEDURE — 1160F RVW MEDS BY RX/DR IN RCRD: CPT | Mod: CPTII,,, | Performed by: COLON & RECTAL SURGERY

## 2023-08-24 PROCEDURE — 3078F PR MOST RECENT DIASTOLIC BLOOD PRESSURE < 80 MM HG: ICD-10-PCS | Mod: CPTII,,, | Performed by: COLON & RECTAL SURGERY

## 2023-08-24 PROCEDURE — 4010F PR ACE/ARB THEARPY RXD/TAKEN: ICD-10-PCS | Mod: CPTII,,, | Performed by: COLON & RECTAL SURGERY

## 2023-08-24 PROCEDURE — 3078F DIAST BP <80 MM HG: CPT | Mod: CPTII,,, | Performed by: COLON & RECTAL SURGERY

## 2023-08-24 PROCEDURE — 1159F MED LIST DOCD IN RCRD: CPT | Mod: CPTII,,, | Performed by: COLON & RECTAL SURGERY

## 2023-08-24 NOTE — PROGRESS NOTES
CRS Office Visit Postop    Referring Md:   No referring provider defined for this encounter.    SUBJECTIVE:     Chief Complaint: rectal polyp    History of Present Illness:  Course is as follows:  Patient is a 47 y.o. female presents with rectal polyp  3/30/23: colonoscopy done for high risk screening due to extensive family history of polyps.     Findings: A 20 mm polypoid lesion was found in the distal rectum. The lesion was polypoid and pedunculated. Biopsies were taken with a cold forceps for histology.  Abutted the dentate line with indiscrete margins.     - biopsies with tubular adenoma.   Functionally, she is 1 bowel movement per day.  No issues with fecal incontinence.  Occasional bleeding.  No prolapse of tissue.  Quit smoking in 2021.  Family history significant for colon cancer in her maternal great grandmother.  No past anorectal surgeries.    7/27/23: transanal excision   - findings: Right posterior 3cm polypoid lesion.  Excised with gross negative margins.  Closed with running 3-0 Vicryl   - pathology: tubular adenoma    8/24/23:  Did well postoperatively and then developed significant bleeding after straining that has since improved over the past several days.  Having regular bowel movements.  No further pain or prolapse tissue.    Last Colonoscopy: 3/30/23:  Impression:            - Redundant colon.                          - Severe diverticulosis in the sigmoid colon.                          There was narrowing of the colon in association                          with the diverticular opening.                          - Polypoid lesion in the distal rectum. Biopsied.                          This goes up to the dentate line and does not have                          discrete borders on any side.                          - Internal hemorrhoids.       Review of Systems:  Review of Systems   Constitutional:  Negative for chills, diaphoresis, fever, malaise/fatigue and weight loss.   HENT:  Negative  "for congestion.    Respiratory:  Negative for shortness of breath.    Cardiovascular:  Negative for chest pain and leg swelling.   Gastrointestinal:  Negative for abdominal pain, blood in stool, constipation, nausea and vomiting.   Genitourinary:  Negative for dysuria.   Musculoskeletal:  Negative for back pain and myalgias.   Skin:  Negative for rash.   Neurological:  Negative for dizziness and weakness.   Endo/Heme/Allergies:  Does not bruise/bleed easily.   Psychiatric/Behavioral:  Negative for depression.        OBJECTIVE:     Vital Signs (Most Recent)  BP 98/75 (BP Location: Left arm, Patient Position: Sitting, BP Method: Small (Automatic))   Ht 5' 3" (1.6 m)   Wt 80.6 kg (177 lb 9.3 oz)   LMP  (LMP Unknown)   BMI 31.46 kg/m²     Physical Exam:  General: White female in no distress   Neuro: alert and oriented x 4.  Moves all extremities.     HEENT: no icterus.  Trachea midline  Respiratory: respirations are even and unlabored  Cardiac: regular rate  Abdomen:  Soft, nontender, no masses  Extremities: Warm dry and intact  Skin: no rashes  Anorectal:  Deferred from today.  From prior exam External exam was normal.  Digital exam performed.  Soft polypoid lesion palpated posterior midline abutting the dentate line.  Appears free from the underlying muscle.  Detailed anoscopy performed.  2 cm polypoid lesion seen posterior midline abutting the dentate line.    Labs: H&H 14 and 40.  Normal renal function    Imaging: none      ASSESSMENT/PLAN:     Diagnoses and all orders for this visit:    Rectal polyp          47 y.o. female with distal rectal adenomatous polyp abutting the dentate line s/p transanal excision 7/27/23 with final pathology with tubular adenoma.  Will plan to see her back in 1 year for interval evaluation.  Repeat colonoscopy in 3 years.      KOKO Zavaleta MD, FACS, FASCRS  Staff Surgeon  Colon & Rectal Surgery      "

## 2023-12-20 ENCOUNTER — OFFICE VISIT (OUTPATIENT)
Dept: UROLOGY | Facility: CLINIC | Age: 48
End: 2023-12-20
Payer: MEDICAID

## 2023-12-20 VITALS
HEIGHT: 63 IN | DIASTOLIC BLOOD PRESSURE: 71 MMHG | HEART RATE: 99 BPM | SYSTOLIC BLOOD PRESSURE: 114 MMHG | BODY MASS INDEX: 31.46 KG/M2

## 2023-12-20 DIAGNOSIS — R35.0 URINARY FREQUENCY: ICD-10-CM

## 2023-12-20 DIAGNOSIS — N39.3 SUI (STRESS URINARY INCONTINENCE, FEMALE): Primary | ICD-10-CM

## 2023-12-20 PROCEDURE — 99999 PR PBB SHADOW E&M-EST. PATIENT-LVL V: CPT | Mod: PBBFAC,,, | Performed by: UROLOGY

## 2023-12-20 PROCEDURE — 3078F DIAST BP <80 MM HG: CPT | Mod: CPTII,,, | Performed by: UROLOGY

## 2023-12-20 PROCEDURE — 1159F PR MEDICATION LIST DOCUMENTED IN MEDICAL RECORD: ICD-10-PCS | Mod: CPTII,,, | Performed by: UROLOGY

## 2023-12-20 PROCEDURE — 99215 OFFICE O/P EST HI 40 MIN: CPT | Mod: S$PBB,,, | Performed by: UROLOGY

## 2023-12-20 PROCEDURE — 3074F SYST BP LT 130 MM HG: CPT | Mod: CPTII,,, | Performed by: UROLOGY

## 2023-12-20 PROCEDURE — 1159F MED LIST DOCD IN RCRD: CPT | Mod: CPTII,,, | Performed by: UROLOGY

## 2023-12-20 PROCEDURE — 1160F PR REVIEW ALL MEDS BY PRESCRIBER/CLIN PHARMACIST DOCUMENTED: ICD-10-PCS | Mod: CPTII,,, | Performed by: UROLOGY

## 2023-12-20 PROCEDURE — 1160F RVW MEDS BY RX/DR IN RCRD: CPT | Mod: CPTII,,, | Performed by: UROLOGY

## 2023-12-20 PROCEDURE — 99215 OFFICE O/P EST HI 40 MIN: CPT | Mod: PBBFAC,PO | Performed by: UROLOGY

## 2023-12-20 PROCEDURE — 3008F BODY MASS INDEX DOCD: CPT | Mod: CPTII,,, | Performed by: UROLOGY

## 2023-12-20 PROCEDURE — 99215 PR OFFICE/OUTPT VISIT, EST, LEVL V, 40-54 MIN: ICD-10-PCS | Mod: S$PBB,,, | Performed by: UROLOGY

## 2023-12-20 PROCEDURE — 3074F PR MOST RECENT SYSTOLIC BLOOD PRESSURE < 130 MM HG: ICD-10-PCS | Mod: CPTII,,, | Performed by: UROLOGY

## 2023-12-20 PROCEDURE — 99999 PR PBB SHADOW E&M-EST. PATIENT-LVL V: ICD-10-PCS | Mod: PBBFAC,,, | Performed by: UROLOGY

## 2023-12-20 PROCEDURE — 3078F PR MOST RECENT DIASTOLIC BLOOD PRESSURE < 80 MM HG: ICD-10-PCS | Mod: CPTII,,, | Performed by: UROLOGY

## 2023-12-20 PROCEDURE — 3008F PR BODY MASS INDEX (BMI) DOCUMENTED: ICD-10-PCS | Mod: CPTII,,, | Performed by: UROLOGY

## 2023-12-20 PROCEDURE — 4010F PR ACE/ARB THEARPY RXD/TAKEN: ICD-10-PCS | Mod: CPTII,,, | Performed by: UROLOGY

## 2023-12-20 PROCEDURE — 4010F ACE/ARB THERAPY RXD/TAKEN: CPT | Mod: CPTII,,, | Performed by: UROLOGY

## 2023-12-20 RX ORDER — OMEPRAZOLE 40 MG/1
40 CAPSULE, DELAYED RELEASE ORAL EVERY MORNING
COMMUNITY
Start: 2023-11-14

## 2023-12-20 RX ORDER — OXYCODONE AND ACETAMINOPHEN 10; 325 MG/1; MG/1
1 TABLET ORAL EVERY 4 HOURS PRN
COMMUNITY
Start: 2023-05-09 | End: 2023-12-22

## 2023-12-20 RX ORDER — LIRAGLUTIDE 6 MG/ML
1.8 INJECTION SUBCUTANEOUS
COMMUNITY
Start: 2023-09-29 | End: 2023-12-22

## 2023-12-20 RX ORDER — SODIUM CHLORIDE 9 MG/ML
INJECTION, SOLUTION INTRAVENOUS CONTINUOUS
Status: CANCELLED | OUTPATIENT
Start: 2023-12-20

## 2023-12-20 RX ORDER — CIPROFLOXACIN 2 MG/ML
400 INJECTION, SOLUTION INTRAVENOUS
Status: CANCELLED | OUTPATIENT
Start: 2023-12-20

## 2023-12-20 NOTE — PROGRESS NOTES
Subjective:      Patient ID: Trice Fink is a 48 y.o. female.    Chief Complaint: vaginal prolapse    Ms. Fink is a 48-year-old female with a history of type 3 stress urinary incontinence.  The patient underwent urodynamic testing in March of 2023 and was found have type 3 stress incontinence.  She does have some lateral cystocele but no central cystocele no prolapse of the bladder.  The patient was informed that this could be repaired with a mid urethral sling.  The patient does not require cystocele repair at this time.  In the future the bladder may fall and erectile may fall and she may need further repair but for stress incontinence she will just need a suburethral sling.  The risk, benefits and complications of the procedure will be explained the patient prior to her leaving the office today and informed consent obtained prior to leaving the office.    Other  This is a chronic (Type 3 stress urinary incontinence) problem. The current episode started more than 1 year ago. The problem occurs constantly. The problem has been gradually worsening. Pertinent negatives include no abdominal pain, anorexia, arthralgias, change in bowel habit, chest pain, chills, congestion, coughing, diaphoresis, fatigue, fever, headaches, joint swelling, myalgias, nausea, neck pain, numbness, rash, sore throat, swollen glands, urinary symptoms, vertigo, visual change, vomiting or weakness. The symptoms are aggravated by twisting, exertion, bending and coughing. She has tried nothing for the symptoms. The treatment provided significant relief.     Review of Systems   Constitutional:  Negative for activity change, appetite change, chills, diaphoresis, fatigue and fever.   HENT:  Negative for congestion, ear pain, hearing loss, nosebleeds, sinus pressure, sore throat and trouble swallowing.    Eyes:  Negative for pain and visual disturbance.   Respiratory:  Negative for apnea, cough and shortness of breath.    Cardiovascular:   Negative for chest pain and leg swelling.   Gastrointestinal:  Negative for abdominal distention, abdominal pain, anal bleeding, anorexia, blood in stool, change in bowel habit, constipation, diarrhea, nausea, rectal pain and vomiting.   Endocrine: Negative for cold intolerance, heat intolerance, polydipsia, polyphagia and polyuria.   Genitourinary:  Negative for decreased urine volume, difficulty urinating, dyspareunia, dysuria, enuresis, flank pain, frequency, genital sores, hematuria, menstrual problem, pelvic pain, urgency, vaginal bleeding, vaginal discharge and vaginal pain.   Musculoskeletal:  Negative for arthralgias, back pain, joint swelling, myalgias and neck pain.   Skin:  Negative for color change, pallor and rash.   Allergic/Immunologic: Negative for environmental allergies, food allergies and immunocompromised state.   Neurological:  Negative for dizziness, vertigo, speech difficulty, weakness, numbness and headaches.   Hematological:  Negative for adenopathy. Does not bruise/bleed easily.   Psychiatric/Behavioral: Negative.        Objective:     Physical Exam  Vitals reviewed.   Constitutional:       General: She is not in acute distress.     Appearance: Normal appearance. She is not ill-appearing, toxic-appearing or diaphoretic.   HENT:      Head: Normocephalic and atraumatic.      Right Ear: External ear normal. There is no impacted cerumen.      Left Ear: External ear normal. There is no impacted cerumen.      Nose: Nose normal. No congestion or rhinorrhea.      Mouth/Throat:      Pharynx: No oropharyngeal exudate or posterior oropharyngeal erythema.   Eyes:      General: No scleral icterus.        Right eye: No discharge.         Left eye: No discharge.      Extraocular Movements: Extraocular movements intact.      Pupils: Pupils are equal, round, and reactive to light.   Cardiovascular:      Rate and Rhythm: Normal rate and regular rhythm.      Pulses: Normal pulses.      Heart sounds: Normal  heart sounds.   Pulmonary:      Effort: Pulmonary effort is normal.      Breath sounds: Normal breath sounds.   Abdominal:      General: Abdomen is flat.      Tenderness: There is no right CVA tenderness or left CVA tenderness.   Genitourinary:     General: Normal vulva.      Rectum: Normal.   Musculoskeletal:      Cervical back: Normal range of motion and neck supple.   Skin:     General: Skin is warm.      Capillary Refill: Capillary refill takes less than 2 seconds.      Findings: No lesion or rash.   Neurological:      General: No focal deficit present.      Mental Status: She is oriented to person, place, and time.      Gait: Gait normal.      Deep Tendon Reflexes: Reflexes normal.   Psychiatric:         Mood and Affect: Mood normal.         Behavior: Behavior normal.         Thought Content: Thought content normal.         Judgment: Judgment normal.        Assessment:      1. JEN (stress urinary incontinence, female)    2. Urinary frequency      Plan:     Patient Instructions   Plan suburethral/mid urethral sling on 12/28/2023 at 9:15 a.m. at [facility]

## 2024-01-29 ENCOUNTER — OFFICE VISIT (OUTPATIENT)
Dept: UROLOGY | Facility: CLINIC | Age: 49
End: 2024-01-29
Payer: MEDICAID

## 2024-01-29 VITALS
SYSTOLIC BLOOD PRESSURE: 114 MMHG | HEIGHT: 63 IN | DIASTOLIC BLOOD PRESSURE: 76 MMHG | HEART RATE: 90 BPM | BODY MASS INDEX: 32.61 KG/M2 | WEIGHT: 184.06 LBS

## 2024-01-29 DIAGNOSIS — R39.15 URINARY URGENCY: ICD-10-CM

## 2024-01-29 DIAGNOSIS — N39.3 SUI (STRESS URINARY INCONTINENCE, FEMALE): ICD-10-CM

## 2024-01-29 DIAGNOSIS — Z98.890 POST-OPERATIVE STATE: Primary | ICD-10-CM

## 2024-01-29 DIAGNOSIS — N39.41 URGE URINARY INCONTINENCE: ICD-10-CM

## 2024-01-29 LAB
BILIRUB UR QL STRIP: NEGATIVE
CLARITY UR REFRACT.AUTO: CLEAR
COLOR UR AUTO: YELLOW
GLUCOSE UR QL STRIP: NEGATIVE
HGB UR QL STRIP: NEGATIVE
KETONES UR QL STRIP: NEGATIVE
LEUKOCYTE ESTERASE UR QL STRIP: NEGATIVE
NITRITE UR QL STRIP: NEGATIVE
PH UR STRIP: 6 [PH] (ref 5–8)
PROT UR QL STRIP: NEGATIVE
SP GR UR STRIP: 1.02 (ref 1–1.03)
URN SPEC COLLECT METH UR: NORMAL

## 2024-01-29 PROCEDURE — 81003 URINALYSIS AUTO W/O SCOPE: CPT | Performed by: NURSE PRACTITIONER

## 2024-01-29 PROCEDURE — 3074F SYST BP LT 130 MM HG: CPT | Mod: CPTII,,, | Performed by: NURSE PRACTITIONER

## 2024-01-29 PROCEDURE — 87086 URINE CULTURE/COLONY COUNT: CPT | Performed by: NURSE PRACTITIONER

## 2024-01-29 PROCEDURE — 3078F DIAST BP <80 MM HG: CPT | Mod: CPTII,,, | Performed by: NURSE PRACTITIONER

## 2024-01-29 PROCEDURE — 99214 OFFICE O/P EST MOD 30 MIN: CPT | Mod: PBBFAC,PO | Performed by: NURSE PRACTITIONER

## 2024-01-29 PROCEDURE — 1159F MED LIST DOCD IN RCRD: CPT | Mod: CPTII,,, | Performed by: NURSE PRACTITIONER

## 2024-01-29 PROCEDURE — 99024 POSTOP FOLLOW-UP VISIT: CPT | Mod: ,,, | Performed by: NURSE PRACTITIONER

## 2024-01-29 PROCEDURE — 99999 PR PBB SHADOW E&M-EST. PATIENT-LVL IV: CPT | Mod: PBBFAC,,, | Performed by: NURSE PRACTITIONER

## 2024-01-29 PROCEDURE — 1160F RVW MEDS BY RX/DR IN RCRD: CPT | Mod: CPTII,,, | Performed by: NURSE PRACTITIONER

## 2024-01-29 NOTE — PROGRESS NOTES
Subjective:       Patient ID: Trice Fink is a 48 y.o. female.    Chief Complaint: Post-op Evaluation    Patient is here today for her post-op evaluation. She is S/P mid urethral sling placement by Dr. Tavares on 12/28/2023. Patient reports doing well. Her JEN has greatly improve. Since sx she has noted urinary urgency with leakage sometimes. This past week she's not sure if she experienced it, but will pay close attention moving forward.     Other  Chronicity: S/P mid urethral sling placement. Episode onset: 12/28/2023. The problem has been rapidly improving. Associated symptoms include urinary symptoms. Pertinent negatives include no abdominal pain, arthralgias, chills, fatigue, fever, headaches, nausea, swollen glands, vomiting or weakness. Nothing aggravates the symptoms. Treatments tried: mid urethral sling placement. The treatment provided significant relief.     Review of Systems   Constitutional:  Negative for chills, fatigue and fever.   Gastrointestinal:  Negative for abdominal pain, constipation, diarrhea, nausea and vomiting.   Genitourinary:  Positive for nocturia (x0-1) and urgency (with leakage sometimes). Negative for decreased urine volume, difficulty urinating, dysuria, flank pain, frequency and hematuria.   Musculoskeletal:  Negative for arthralgias.   Neurological:  Negative for weakness and headaches.   Psychiatric/Behavioral: Negative.           Objective:      Physical Exam  Vitals and nursing note reviewed.   Constitutional:       General: She is not in acute distress.     Appearance: She is well-developed. She is obese. She is not ill-appearing.   HENT:      Head: Normocephalic and atraumatic.   Eyes:      Pupils: Pupils are equal, round, and reactive to light.   Cardiovascular:      Rate and Rhythm: Normal rate.   Pulmonary:      Effort: Pulmonary effort is normal. No respiratory distress.   Abdominal:      Palpations: Abdomen is soft.      Tenderness: There is no abdominal tenderness.    Genitourinary:     Vagina: Normal. No vaginal discharge, erythema, tenderness or lesions.      Comments: No stitches noted.  Musculoskeletal:         General: Normal range of motion.      Cervical back: Normal range of motion.   Skin:     General: Skin is warm and dry.   Neurological:      Mental Status: She is alert and oriented to person, place, and time.      Coordination: Coordination normal.   Psychiatric:         Mood and Affect: Mood normal.         Behavior: Behavior normal.         Thought Content: Thought content normal.         Judgment: Judgment normal.         Assessment:       Problem List Items Addressed This Visit          Renal/    JEN (stress urinary incontinence, female)    Relevant Orders    Urinalysis    Urine culture     Other Visit Diagnoses       Post-operative state    -  Primary    Relevant Orders    Urinalysis    Urine culture    Urinary urgency        Relevant Orders    Urinalysis    Urine culture    Urge urinary incontinence        Relevant Orders    Urinalysis    Urine culture            Plan:           Trice was seen today for post-op evaluation.    Diagnoses and all orders for this visit:    Post-operative state  -     Urinalysis  -     Urine culture    JEN (stress urinary incontinence, female)  -     Urinalysis  -     Urine culture    Urinary urgency  -     Urinalysis  -     Urine culture    Urge urinary incontinence  -     Urinalysis  -     Urine culture    Follow-up pending urine cx results.     Lisbeth Chavez NP

## 2024-01-30 LAB — BACTERIA UR CULT: NO GROWTH

## 2024-02-05 ENCOUNTER — TELEPHONE (OUTPATIENT)
Dept: UROLOGY | Facility: CLINIC | Age: 49
End: 2024-02-05
Payer: MEDICAID

## 2024-02-05 NOTE — TELEPHONE ENCOUNTER
----- Message from Lisbeth Chavez NP sent at 2/4/2024 10:15 PM CST -----  Please inform patient via telephone that her urine cx was normal. She does not have a UTI.

## 2024-09-17 ENCOUNTER — TELEPHONE (OUTPATIENT)
Dept: FAMILY MEDICINE | Facility: CLINIC | Age: 49
End: 2024-09-17
Payer: MEDICAID

## 2024-09-17 NOTE — TELEPHONE ENCOUNTER
Spoke with pt in regards of her message. Pt informed me that she was calling to est care with Dr. Hammond. Informed pt that Dr. Hammond is not taking any NEW MEDICAID patient's at this time. Gave pt number to [facility] to call for an apt. Pt verbalized understanding of message.

## 2024-09-17 NOTE — TELEPHONE ENCOUNTER
----- Message from Elin Chacon sent at 9/17/2024  3:11 PM CDT -----  Type:  Sooner Appointment Request    Caller is requesting a sooner appointment.  Caller declined first available appointment listed below.  Caller will not accept being placed on the waitlist and is requesting a message be sent to doctor.  Name of Caller:pt  When is the first available appointment?n/a  Symptoms:est care  Would the patient rather a call back or a response via [email or web address]? call  Best Call Back Number: [phone]  Additional Information: